# Patient Record
Sex: MALE | Race: WHITE | NOT HISPANIC OR LATINO | Employment: UNEMPLOYED | URBAN - METROPOLITAN AREA
[De-identification: names, ages, dates, MRNs, and addresses within clinical notes are randomized per-mention and may not be internally consistent; named-entity substitution may affect disease eponyms.]

---

## 2021-12-17 ENCOUNTER — HISTORICAL (OUTPATIENT)
Dept: LAB | Facility: HOSPITAL | Age: 31
End: 2021-12-17

## 2021-12-17 LAB
ABS NEUT (OLG): 1.84 X10(3)/MCL (ref 2.1–9.2)
ALBUMIN SERPL-MCNC: 4.1 GM/DL (ref 3.5–5)
ALBUMIN/GLOB SERPL: 1.4 RATIO (ref 1.1–2)
ALP SERPL-CCNC: 61 UNIT/L (ref 40–150)
ALT SERPL-CCNC: 90 UNIT/L (ref 0–55)
AST SERPL-CCNC: 48 UNIT/L (ref 5–34)
BASOPHILS # BLD AUTO: 0 X10(3)/MCL (ref 0–0.2)
BASOPHILS NFR BLD AUTO: 0 %
BILIRUB SERPL-MCNC: 0.5 MG/DL
BILIRUBIN DIRECT+TOT PNL SERPL-MCNC: 0.2 MG/DL (ref 0–0.5)
BILIRUBIN DIRECT+TOT PNL SERPL-MCNC: 0.3 MG/DL (ref 0–0.8)
BUN SERPL-MCNC: 12.4 MG/DL (ref 8.9–20.6)
CALCIUM SERPL-MCNC: 9.5 MG/DL (ref 8.7–10.5)
CHLORIDE SERPL-SCNC: 105 MMOL/L (ref 98–107)
CO2 SERPL-SCNC: 28 MMOL/L (ref 22–29)
CREAT SERPL-MCNC: 0.88 MG/DL (ref 0.73–1.18)
EOSINOPHIL # BLD AUTO: 0.1 X10(3)/MCL (ref 0–0.9)
EOSINOPHIL NFR BLD AUTO: 2 %
ERYTHROCYTE [DISTWIDTH] IN BLOOD BY AUTOMATED COUNT: 11.7 % (ref 11.5–14.5)
FERRITIN SERPL-MCNC: 73.74 NG/ML (ref 21.81–274.66)
GLOBULIN SER-MCNC: 2.9 GM/DL (ref 2.4–3.5)
GLUCOSE SERPL-MCNC: 104 MG/DL (ref 74–100)
HAV AB SER QL IA: REACTIVE
HBV CORE AB SERPL QL IA: NONREACTIVE
HBV SURFACE AB SER-ACNC: 13.91 MIU/ML
HBV SURFACE AB SERPL IA-ACNC: REACTIVE M[IU]/ML
HBV SURFACE AG SERPL QL IA: NONREACTIVE
HCT VFR BLD AUTO: 43.3 % (ref 40–51)
HGB BLD-MCNC: 14.8 GM/DL (ref 13.5–17.5)
HIV 1+2 AB+HIV1 P24 AG SERPL QL IA: NONREACTIVE
IMM GRANULOCYTES # BLD AUTO: 0.01 10*3/UL
IMM GRANULOCYTES NFR BLD AUTO: 0 %
INR PPP: 0.98 (ref 0.9–1.2)
LYMPHOCYTES # BLD AUTO: 2.9 X10(3)/MCL (ref 0.6–4.6)
LYMPHOCYTES NFR BLD AUTO: 54 %
MCH RBC QN AUTO: 28.9 PG (ref 26–34)
MCHC RBC AUTO-ENTMCNC: 34.2 GM/DL (ref 31–37)
MCV RBC AUTO: 84.6 FL (ref 80–100)
MONOCYTES # BLD AUTO: 0.5 X10(3)/MCL (ref 0.1–1.3)
MONOCYTES NFR BLD AUTO: 9 %
NEUTROPHILS # BLD AUTO: 1.84 X10(3)/MCL (ref 2.1–9.2)
NEUTROPHILS NFR BLD AUTO: 35 %
NRBC BLD AUTO-RTO: 0 % (ref 0–0.2)
PLATELET # BLD AUTO: 202 X10(3)/MCL (ref 130–400)
PMV BLD AUTO: 10.1 FL (ref 7.4–10.4)
POTASSIUM SERPL-SCNC: 3.8 MMOL/L (ref 3.5–5.1)
PROT SERPL-MCNC: 7 GM/DL (ref 6.4–8.3)
PROTHROMBIN TIME: 12.8 SECOND(S) (ref 11.9–14.4)
RBC # BLD AUTO: 5.12 X10(6)/MCL (ref 4.5–5.9)
SODIUM SERPL-SCNC: 140 MMOL/L (ref 136–145)
T PALLIDUM AB SER QL: NONREACTIVE
WBC # SPEC AUTO: 5.3 X10(3)/MCL (ref 4.5–11)

## 2022-06-16 ENCOUNTER — TELEPHONE (OUTPATIENT)
Dept: INFECTIOUS DISEASES | Facility: CLINIC | Age: 32
End: 2022-06-16

## 2022-06-16 DIAGNOSIS — B19.20 HEPATITIS C VIRUS INFECTION WITHOUT HEPATIC COMA, UNSPECIFIED CHRONICITY: Primary | ICD-10-CM

## 2022-06-16 NOTE — TELEPHONE ENCOUNTER
----- Message from Katy Merchant sent at 6/16/2022 11:07 AM CDT -----  Regarding: FIBROSCAN ORDER  #LEONARD#    Need order for fibroscan please

## 2022-08-05 DIAGNOSIS — B19.20 HEPATITIS C VIRUS INFECTION WITHOUT HEPATIC COMA, UNSPECIFIED CHRONICITY: Primary | ICD-10-CM

## 2022-08-30 ENCOUNTER — TELEPHONE (OUTPATIENT)
Dept: INFECTIOUS DISEASES | Facility: CLINIC | Age: 32
End: 2022-08-30

## 2022-08-30 NOTE — TELEPHONE ENCOUNTER
Called Ashtyn,  here at [facility], to let her know that this patient needs u/s and labs prior to appt.  Voiced understanding.

## 2022-09-09 ENCOUNTER — LAB VISIT (OUTPATIENT)
Dept: LAB | Facility: HOSPITAL | Age: 32
End: 2022-09-09
Attending: NURSE PRACTITIONER
Payer: COMMERCIAL

## 2022-09-09 DIAGNOSIS — B19.20 HEPATITIS C VIRUS INFECTION WITHOUT HEPATIC COMA, UNSPECIFIED CHRONICITY: ICD-10-CM

## 2022-09-09 LAB
ALBUMIN SERPL-MCNC: 4 GM/DL (ref 3.5–5)
ALBUMIN/GLOB SERPL: 1.3 RATIO (ref 1.1–2)
ALP SERPL-CCNC: 57 UNIT/L (ref 40–150)
ALT SERPL-CCNC: 164 UNIT/L (ref 0–55)
AST SERPL-CCNC: 67 UNIT/L (ref 5–34)
BASOPHILS # BLD AUTO: 0.02 X10(3)/MCL (ref 0–0.2)
BASOPHILS NFR BLD AUTO: 0.4 %
BILIRUBIN DIRECT+TOT PNL SERPL-MCNC: 0.4 MG/DL
BUN SERPL-MCNC: 13.9 MG/DL (ref 8.9–20.6)
CALCIUM SERPL-MCNC: 9.3 MG/DL (ref 8.4–10.2)
CHLORIDE SERPL-SCNC: 107 MMOL/L (ref 98–107)
CO2 SERPL-SCNC: 26 MMOL/L (ref 22–29)
CREAT SERPL-MCNC: 0.81 MG/DL (ref 0.73–1.18)
EOSINOPHIL # BLD AUTO: 0.11 X10(3)/MCL (ref 0–0.9)
EOSINOPHIL NFR BLD AUTO: 2.3 %
ERYTHROCYTE [DISTWIDTH] IN BLOOD BY AUTOMATED COUNT: 11.9 % (ref 11.5–17)
FERRITIN SERPL-MCNC: 84 NG/ML (ref 21.81–274.66)
GFR SERPLBLD CREATININE-BSD FMLA CKD-EPI: >60 MLS/MIN/1.73/M2
GLOBULIN SER-MCNC: 3.1 GM/DL (ref 2.4–3.5)
GLUCOSE SERPL-MCNC: 84 MG/DL (ref 74–100)
HCT VFR BLD AUTO: 44.3 % (ref 42–52)
HGB BLD-MCNC: 14.7 GM/DL (ref 14–18)
HIV 1+2 AB+HIV1 P24 AG SERPL QL IA: NONREACTIVE
IMM GRANULOCYTES # BLD AUTO: 0.01 X10(3)/MCL (ref 0–0.04)
IMM GRANULOCYTES NFR BLD AUTO: 0.2 %
LYMPHOCYTES # BLD AUTO: 2.52 X10(3)/MCL (ref 0.6–4.6)
LYMPHOCYTES NFR BLD AUTO: 52.3 %
MCH RBC QN AUTO: 28 PG (ref 27–31)
MCHC RBC AUTO-ENTMCNC: 33.2 MG/DL (ref 33–36)
MCV RBC AUTO: 84.4 FL (ref 80–94)
MONOCYTES # BLD AUTO: 0.4 X10(3)/MCL (ref 0.1–1.3)
MONOCYTES NFR BLD AUTO: 8.3 %
NEUTROPHILS # BLD AUTO: 1.8 X10(3)/MCL (ref 2.1–9.2)
NEUTROPHILS NFR BLD AUTO: 36.5 %
NRBC BLD AUTO-RTO: 0 %
PLATELET # BLD AUTO: 207 X10(3)/MCL (ref 130–400)
PMV BLD AUTO: 9.6 FL (ref 7.4–10.4)
POTASSIUM SERPL-SCNC: 4.1 MMOL/L (ref 3.5–5.1)
PROT SERPL-MCNC: 7.1 GM/DL (ref 6.4–8.3)
RBC # BLD AUTO: 5.25 X10(6)/MCL (ref 4.7–6.1)
SODIUM SERPL-SCNC: 142 MMOL/L (ref 136–145)
T PALLIDUM AB SER QL: NONREACTIVE
WBC # SPEC AUTO: 4.8 X10(3)/MCL (ref 4.5–11.5)

## 2022-09-09 PROCEDURE — 81596 NFCT DS CHRNC HCV 6 ASSAYS: CPT

## 2022-09-09 PROCEDURE — 85610 PROTHROMBIN TIME: CPT

## 2022-09-09 PROCEDURE — 80053 COMPREHEN METABOLIC PANEL: CPT

## 2022-09-09 PROCEDURE — 82728 ASSAY OF FERRITIN: CPT

## 2022-09-09 PROCEDURE — 36415 COLL VENOUS BLD VENIPUNCTURE: CPT

## 2022-09-09 PROCEDURE — 86780 TREPONEMA PALLIDUM: CPT

## 2022-09-09 PROCEDURE — 87522 HEPATITIS C REVRS TRNSCRPJ: CPT

## 2022-09-09 PROCEDURE — 85025 COMPLETE CBC W/AUTO DIFF WBC: CPT

## 2022-09-09 PROCEDURE — 87389 HIV-1 AG W/HIV-1&-2 AB AG IA: CPT

## 2022-09-09 PROCEDURE — 86039 ANTINUCLEAR ANTIBODIES (ANA): CPT

## 2022-09-10 LAB — HCV RNA SERPL NAA+PROBE-ACNC: ABNORMAL IU/ML

## 2022-09-12 LAB
A2 MACROGLOB SERPL-MCNC: 176 MG/DL (ref 100–280)
ALT SERPL W P-5'-P-CCNC: 181 U/L (ref 7–55)
ANNOTATION COMMENT IMP: ABNORMAL
APO A-I SERPL-MCNC: 122 MG/DL
BILIRUB SERPL-MCNC: 0.3 MG/DL
FIBROSIS STAGE SERPL QL: ABNORMAL
GGT SERPL-CCNC: 26 U/L (ref 8–61)
HAPTOGLOB SERPL NEPH-MCNC: 94 MG/DL (ref 30–200)
LIVER FIBR SCORE SERPL CALC.FIBROSURE: 0.12
LIVER FIBROSIS INTERPRETATION SER-IMP: ABNORMAL
NECROINFLAMMATORY ACT GRADE SERPL QL: ABNORMAL
NECROINFLAMMATORY ACT SCORE SERPL: 0.73
NECROINFLAMMATORY ACTIV INTERP SER-IMP: ABNORMAL
SERIAL #: ABNORMAL

## 2022-09-14 LAB
AR ANA INTERPRETIVE COMMENT: NORMAL
AR ANTINUCLEAR ANTIBODY (ANA), HEP-2, IGG: NORMAL

## 2022-10-07 ENCOUNTER — HOSPITAL ENCOUNTER (OUTPATIENT)
Dept: RADIOLOGY | Facility: HOSPITAL | Age: 32
Discharge: HOME | End: 2022-10-07
Attending: NURSE PRACTITIONER
Payer: COMMERCIAL

## 2022-10-07 ENCOUNTER — TELEPHONE (OUTPATIENT)
Dept: INFECTIOUS DISEASES | Facility: CLINIC | Age: 32
End: 2022-10-07
Payer: COMMERCIAL

## 2022-10-07 DIAGNOSIS — B19.20 HEPATITIS C VIRUS INFECTION WITHOUT HEPATIC COMA, UNSPECIFIED CHRONICITY: ICD-10-CM

## 2022-10-07 PROCEDURE — 76705 ECHO EXAM OF ABDOMEN: CPT | Mod: TC

## 2022-10-07 NOTE — TELEPHONE ENCOUNTER
----- Message from MAHESH Lam sent at 10/7/2022  2:55 PM CDT -----  Liver US shows hepatic steatosis. Please contact patient with results and the following recommendations:  Goal BMI < 25.   Limit consumption of high fat foods, high sugar foods, and salt.   Avoid alcohol and illicit drug use.  Increase exercise activity; 30 minutes of strenuous activity 3-5 x week.   Also, I do not see where patient has an appt scheduled.

## 2022-10-07 NOTE — PROGRESS NOTES
Liver US shows hepatic steatosis. Please contact patient with results and the following recommendations:  Goal BMI < 25.   Limit consumption of high fat foods, high sugar foods, and salt.   Avoid alcohol and illicit drug use.  Increase exercise activity; 30 minutes of strenuous activity 3-5 x week.   Also, I do not see where patient has an appt scheduled.

## 2022-10-10 ENCOUNTER — TELEPHONE (OUTPATIENT)
Dept: INFECTIOUS DISEASES | Facility: CLINIC | Age: 32
End: 2022-10-10
Payer: COMMERCIAL

## 2022-10-10 NOTE — TELEPHONE ENCOUNTER
Called [phone] Spoke with  -Angie she asked that I fax results to [phone]. Will fax this note and US result

## 2022-11-23 DIAGNOSIS — R10.32 LEFT LOWER QUADRANT PAIN: Primary | ICD-10-CM

## 2022-12-02 ENCOUNTER — OFFICE VISIT (OUTPATIENT)
Dept: INFECTIOUS DISEASES | Facility: CLINIC | Age: 32
End: 2022-12-02
Payer: COMMERCIAL

## 2022-12-02 ENCOUNTER — TELEPHONE (OUTPATIENT)
Dept: INFECTIOUS DISEASES | Facility: CLINIC | Age: 32
End: 2022-12-02

## 2022-12-02 VITALS
HEART RATE: 63 BPM | WEIGHT: 245.13 LBS | RESPIRATION RATE: 16 BRPM | TEMPERATURE: 98 F | SYSTOLIC BLOOD PRESSURE: 134 MMHG | DIASTOLIC BLOOD PRESSURE: 87 MMHG | BODY MASS INDEX: 33.2 KG/M2 | HEIGHT: 72 IN

## 2022-12-02 DIAGNOSIS — Z23 IMMUNIZATION DUE: ICD-10-CM

## 2022-12-02 DIAGNOSIS — K76.0 HEPATIC STEATOSIS: ICD-10-CM

## 2022-12-02 DIAGNOSIS — B18.2 CHRONIC HEPATITIS C WITHOUT HEPATIC COMA: Primary | ICD-10-CM

## 2022-12-02 LAB
ALBUMIN SERPL-MCNC: 4.2 GM/DL (ref 3.5–5)
ALBUMIN/GLOB SERPL: 1.4 RATIO (ref 1.1–2)
ALP SERPL-CCNC: 59 UNIT/L (ref 40–150)
ALT SERPL-CCNC: 163 UNIT/L (ref 0–55)
AST SERPL-CCNC: 74 UNIT/L (ref 5–34)
BASOPHILS # BLD AUTO: 0.02 X10(3)/MCL (ref 0–0.2)
BASOPHILS NFR BLD AUTO: 0.4 %
BILIRUBIN DIRECT+TOT PNL SERPL-MCNC: 0.6 MG/DL
BUN SERPL-MCNC: 10.7 MG/DL (ref 8.9–20.6)
CALCIUM SERPL-MCNC: 9.9 MG/DL (ref 8.4–10.2)
CHLORIDE SERPL-SCNC: 103 MMOL/L (ref 98–107)
CO2 SERPL-SCNC: 26 MMOL/L (ref 22–29)
CREAT SERPL-MCNC: 0.81 MG/DL (ref 0.73–1.18)
EOSINOPHIL # BLD AUTO: 0.1 X10(3)/MCL (ref 0–0.9)
EOSINOPHIL NFR BLD AUTO: 2.1 %
ERYTHROCYTE [DISTWIDTH] IN BLOOD BY AUTOMATED COUNT: 11.9 % (ref 11.5–17)
ERYTHROCYTE [SEDIMENTATION RATE] IN BLOOD: 1 MM/HR (ref 0–15)
GFR SERPLBLD CREATININE-BSD FMLA CKD-EPI: >60 MLS/MIN/1.73/M2
GLOBULIN SER-MCNC: 3 GM/DL (ref 2.4–3.5)
GLUCOSE SERPL-MCNC: 71 MG/DL (ref 74–100)
HCT VFR BLD AUTO: 46.8 % (ref 42–52)
HGB BLD-MCNC: 15.5 GM/DL (ref 14–18)
HIV 1+2 AB+HIV1 P24 AG SERPL QL IA: NONREACTIVE
IMM GRANULOCYTES # BLD AUTO: 0.01 X10(3)/MCL (ref 0–0.04)
IMM GRANULOCYTES NFR BLD AUTO: 0.2 %
LYMPHOCYTES # BLD AUTO: 2.39 X10(3)/MCL (ref 0.6–4.6)
LYMPHOCYTES NFR BLD AUTO: 49.1 %
MCH RBC QN AUTO: 28.5 PG (ref 27–31)
MCHC RBC AUTO-ENTMCNC: 33.1 MG/DL (ref 33–36)
MCV RBC AUTO: 86 FL (ref 80–94)
MONOCYTES # BLD AUTO: 0.44 X10(3)/MCL (ref 0.1–1.3)
MONOCYTES NFR BLD AUTO: 9 %
NEUTROPHILS # BLD AUTO: 1.9 X10(3)/MCL (ref 2.1–9.2)
NEUTROPHILS NFR BLD AUTO: 39.2 %
NRBC BLD AUTO-RTO: 0 %
PLATELET # BLD AUTO: 215 X10(3)/MCL (ref 130–400)
PMV BLD AUTO: 10.8 FL (ref 7.4–10.4)
POTASSIUM SERPL-SCNC: 4.2 MMOL/L (ref 3.5–5.1)
PROT SERPL-MCNC: 7.2 GM/DL (ref 6.4–8.3)
RBC # BLD AUTO: 5.44 X10(6)/MCL (ref 4.7–6.1)
SODIUM SERPL-SCNC: 139 MMOL/L (ref 136–145)
WBC # SPEC AUTO: 4.9 X10(3)/MCL (ref 4.5–11.5)

## 2022-12-02 PROCEDURE — 36415 COLL VENOUS BLD VENIPUNCTURE: CPT | Performed by: NURSE PRACTITIONER

## 2022-12-02 PROCEDURE — 99204 PR OFFICE/OUTPT VISIT, NEW, LEVL IV, 45-59 MIN: ICD-10-PCS | Mod: S$PBB,,, | Performed by: NURSE PRACTITIONER

## 2022-12-02 PROCEDURE — 85610 PROTHROMBIN TIME: CPT | Performed by: NURSE PRACTITIONER

## 2022-12-02 PROCEDURE — 87389 HIV-1 AG W/HIV-1&-2 AB AG IA: CPT | Performed by: NURSE PRACTITIONER

## 2022-12-02 PROCEDURE — 85025 COMPLETE CBC W/AUTO DIFF WBC: CPT | Performed by: NURSE PRACTITIONER

## 2022-12-02 PROCEDURE — 80053 COMPREHEN METABOLIC PANEL: CPT | Performed by: NURSE PRACTITIONER

## 2022-12-02 PROCEDURE — 90472 IMMUNIZATION ADMIN EACH ADD: CPT | Mod: PBBFAC

## 2022-12-02 PROCEDURE — 99214 OFFICE O/P EST MOD 30 MIN: CPT | Mod: PBBFAC | Performed by: NURSE PRACTITIONER

## 2022-12-02 PROCEDURE — 99204 OFFICE O/P NEW MOD 45 MIN: CPT | Mod: S$PBB,,, | Performed by: NURSE PRACTITIONER

## 2022-12-02 PROCEDURE — 85651 RBC SED RATE NONAUTOMATED: CPT | Performed by: NURSE PRACTITIONER

## 2022-12-02 PROCEDURE — 90686 IIV4 VACC NO PRSV 0.5 ML IM: CPT | Mod: PBBFAC

## 2022-12-02 PROCEDURE — 87522 HEPATITIS C REVRS TRNSCRPJ: CPT | Performed by: NURSE PRACTITIONER

## 2022-12-02 RX ORDER — VELPATASVIR AND SOFOSBUVIR 100; 400 MG/1; MG/1
1 TABLET, FILM COATED ORAL DAILY
Qty: 28 TABLET | Refills: 2 | Status: SHIPPED | OUTPATIENT
Start: 2022-12-02

## 2022-12-02 RX ORDER — SERTRALINE HYDROCHLORIDE 100 MG/1
150 TABLET, FILM COATED ORAL DAILY
COMMUNITY

## 2022-12-02 NOTE — PROGRESS NOTES
Subjective:       Patient ID: Bob Hook is a 31 y.o. male.    Chief Complaint: New referral    Bob Hook is a 32 y/o [facility] inmate here for initial Hep C visit. He is accompanied by two guards. Pt tells me he was dx around [id number], but was never treated.  Risk factors include IVDU (heroine and opiates) and several professional tattoos. Pt denies blood transfusion. He has been incarcerated for the last 22 months.  Denies current alcohol or drug use.  Pretreatment labs done 9/9/22 Hep C VL 11.9 million, F0, Gt2b, APRI 0.594, treatment naive, Child Smith A, AST 67, , GOVIND neg, RPR NR, HIV NR, Hep B surface ab neg, Hep B core ab NR, Hep B surface ab reactive, Hep A IGG reactive.  Pt will need a few updated baseline labs today.  Liver US done 10/7/22 shows hepatic steatosis. Pt denies fever, headache, chills, visual problems, icterus, jaundice, N/V/D, esophageal varices, SOB,cough, abd pain, ascites or hunter colored stools.   BMI 33. He is due for a COVID, flu, and Tdap vaccine today. Ptis amenable. We do not have COVID vaccine here so he will discuss further with [facility].     I spent a total of 45 minutes on the day of the visit.This includes face to face time and non-face to face time preparing to see the patient (eg, review of tests), obtaining and/or reviewing separately obtained history, documenting clinical information in the electronic or other health record, independently interpreting results and communicating results to the patient/family/caregiver, or care coordinator.       Review of Systems   Constitutional: Negative.    HENT: Negative.     Eyes: Negative.    Respiratory: Negative.     Cardiovascular: Negative.    Gastrointestinal: Negative.    Genitourinary: Negative.    Musculoskeletal: Negative.    Integumentary:  Negative.   Neurological: Negative.    Psychiatric/Behavioral: Negative.         Objective:      Physical Exam  Vitals reviewed.   Constitutional:       General: He is not in acute  distress.     Appearance: Normal appearance.   HENT:      Head: Normocephalic.      Right Ear: External ear normal.      Left Ear: External ear normal.      Nose: Nose normal.      Mouth/Throat:      Mouth: Mucous membranes are moist.      Pharynx: Oropharynx is clear.      Comments: No uvula present, surgically removed  Eyes:      General: No scleral icterus.     Extraocular Movements: Extraocular movements intact.      Conjunctiva/sclera: Conjunctivae normal.      Pupils: Pupils are equal, round, and reactive to light.   Cardiovascular:      Rate and Rhythm: Normal rate and regular rhythm.      Pulses: Normal pulses.      Heart sounds: Normal heart sounds.   Pulmonary:      Effort: Pulmonary effort is normal. No respiratory distress.      Breath sounds: Normal breath sounds.   Abdominal:      General: Bowel sounds are normal. There is no distension.      Palpations: Abdomen is soft. There is no mass.      Tenderness: There is no abdominal tenderness. There is no right CVA tenderness or left CVA tenderness.      Hernia: No hernia is present.   Musculoskeletal:         General: No tenderness or signs of injury. Normal range of motion.      Cervical back: Normal range of motion and neck supple.      Right lower leg: No edema.      Left lower leg: No edema.   Lymphadenopathy:      Cervical: No cervical adenopathy.   Skin:     General: Skin is warm and dry.      Capillary Refill: Capillary refill takes less than 2 seconds.      Findings: No erythema or lesion.   Neurological:      General: No focal deficit present.      Mental Status: He is alert and oriented to person, place, and time. Mental status is at baseline.   Psychiatric:         Mood and Affect: Mood normal.         Behavior: Behavior normal.         Thought Content: Thought content normal.         Judgment: Judgment normal.       Assessment:       Problem List Items Addressed This Visit    None  Visit Diagnoses       Chronic hepatitis C without hepatic coma     -  Primary    Relevant Medications    sofosbuvir-velpatasvir 400-100 mg Tab    Other Relevant Orders    CBC Auto Differential    Comprehensive Metabolic Panel    Protime-INR    HIV 1/2 Ag/Ab (4th Gen)    Sedimentation rate    Hepatitis C Virus Quantitative    Hepatic steatosis        Immunization due        Relevant Orders    Influenza - Quadrivalent (PF) (Completed)    (In Office Administered) Tdap Vaccine (Completed)              Plan:       Chronic hepatitis C without hepatic coma  -     CBC Auto Differential; Future; Expected date: 12/02/2022  -     Comprehensive Metabolic Panel; Future; Expected date: 12/02/2022  -     Protime-INR; Future; Expected date: 12/02/2022  -     HIV 1/2 Ag/Ab (4th Gen); Future; Expected date: 12/02/2022  -     Sedimentation rate; Future; Expected date: 12/02/2022  -     Hepatitis C Virus Quantitative; Future; Expected date: 12/02/2022  -     sofosbuvir-velpatasvir 400-100 mg Tab; Take 1 tablet by mouth once daily.  Dispense: 28 tablet; Refill: 2  Pretreatment labs done 9/9/22 Hep C VL 11.9 million, F0, Gt2b, APRI 0.594, treatment naive, Child Smith A, AST 67, , GOVIND neg, RPR NR, HIV NR, Hep B surface ab neg, Hep B core ab NR, Hep B surface ab reactive, Hep A IGG reactive.    Pt will need a few updated baseline labs today.    Liver US done 10/7/22 shows hepatic steatosis.  Long discussion regarding Hep C disease process and available treatment options to include potential risk versus benefits as well as potential adverse effects. Reviewed trial data to include AASLD/IDSA guidelines and available studies - excellent cure rates for pangenotypic disease with Epclusa. All questions addressed at length. Patient voices understanding and is in agreement to proceed. Will plan to start Epclusa today. Refer to PAP for medication assistance if needed. Formal HCV  RN education visit today. Return to clinic 4 weeks after starting meds. HCV  to order all labs and  follow-up per HCV protocol.   Pt encouraged to refrain from alcohol and illicit drug use.  Blood and sex precautions discussed. Do not share a needle, razor, nail clippers, toothbrush, or drug paraphernalia with anyone.  No Tylenol at doses greater than 2000 mg per day.    COVID and flu precautions discussed   Vaccines recommended    Hepatic steatosis  Goal BMI < 25.   Limit consumption of high fat foods, high sugar foods, and salt.   Avoid alcohol and illicit drug use.  Increase exercise activity; 30 minutes of strenuous activity 3-5 x week.     Immunization due  -     Influenza - Quadrivalent (PF); Future; Expected date: 12/02/2022  -     (In Office Administered) Tdap Vaccine; Future; Expected date: 12/02/2022

## 2022-12-02 NOTE — TELEPHONE ENCOUNTER
Spoke with Kevin @ [facility] to inform him that IRVING Jhaveri had escribed a Rx for Epclusa to [facility].  Kevin requested office notes and labs to place in pt's chart at [facility].  Records faxed to [facility].  Kevin will call hcv  back with start date on Epclusa so we can send lab dates and appt dates.

## 2022-12-03 LAB — HCV RNA SERPL NAA+PROBE-ACNC: ABNORMAL IU/ML

## 2022-12-06 ENCOUNTER — OFFICE VISIT (OUTPATIENT)
Dept: SURGERY | Facility: CLINIC | Age: 32
End: 2022-12-06
Payer: COMMERCIAL

## 2022-12-06 VITALS
RESPIRATION RATE: 18 BRPM | DIASTOLIC BLOOD PRESSURE: 94 MMHG | BODY MASS INDEX: 33.24 KG/M2 | HEIGHT: 72 IN | HEART RATE: 63 BPM | OXYGEN SATURATION: 98 % | SYSTOLIC BLOOD PRESSURE: 136 MMHG | TEMPERATURE: 98 F

## 2022-12-06 DIAGNOSIS — R10.32 LEFT LOWER QUADRANT PAIN: ICD-10-CM

## 2022-12-06 PROCEDURE — 99213 OFFICE O/P EST LOW 20 MIN: CPT | Mod: PBBFAC

## 2022-12-06 NOTE — PROGRESS NOTES
Patient seen by Dr. Pemberton. Dr. Pemberton wants to see patient back in 1 month. Dr. Pemberton wants the patient to do a CT scan as well.     Fall Risk

## 2022-12-06 NOTE — PROGRESS NOTES
[facility] General Surgery Clinic Note    HPI: Bob Hook is a 31 y.o. with PMH of Hep C (currently being treated). He has a surgical history of left inguinal hernia repair when he was about 13 years old. Patient reports new-onset hernia about 6 months ago in the region superior to the region of the prior repair. He has intermittent pain, worse with laughing. He cannot see the hernia, but feels pain and weakness in the area. As an inmate at the facility, he was seen by a physician who did an ultrasound and noted the hernia, but they could not feel the hernia at the facility. Patient denies nausea, vomiting, fever, chills. He reports intermittent diarrhea and constipation.    PMH:   Past Medical History:   Diagnosis Date    Depression       Meds:   Current Outpatient Medications:     sertraline (ZOLOFT) 100 MG tablet, Take 150 mg by mouth once daily., Disp: , Rfl:     sofosbuvir-velpatasvir 400-100 mg Tab, Take 1 tablet by mouth once daily. (Patient not taking: Reported on 12/6/2022), Disp: 28 tablet, Rfl: 2  Allergies: Review of patient's allergies indicates:  No Known Allergies  Social History:   Social History     Tobacco Use    Smoking status: Former     Types: Cigarettes, Vaping with nicotine    Smokeless tobacco: Former   Substance Use Topics    Alcohol use: Not Currently    Drug use: Yes     Types: Marijuana     Comment: opiates     Family History:   Family History   Problem Relation Age of Onset    Thyroid disease Mother     Hypertension Father     Kidney disease Father     Diabetes Father     Heart disease Father     No Known Problems Sister      Surgical History:   Past Surgical History:   Procedure Laterality Date    ADENOIDECTOMY      HERNIA REPAIR      TONSILLECTOMY      UVULECTOMY       Review of Systems:  Skin: No rashes or itching.  Head: Denies headache or recent trauma.  Eyes: Denies eye pain or double vision.  Neck: Denies swelling or hoarseness of voice.  Respiratory: Denies shortness of breath or  chest pain  Cardiac: Denies palpitations or swelling in hands/feet.  Gastrointestinal: Denies nausea, denies vomiting.  Having intermittent constipation, diarrhea my resident  Urinary: Denies dysuria or hematuria.  Vascular: Denies claudication or leg swelling.  Neuro: Denies motor deficits. Denies weakness.  Endocrine: Denies excessive sweating or cold intolerance.  Psych: Denies memory problems. Denies anxiety.    Objective:    Vitals:  Vitals:    12/06/22 0903   BP: (!) 136/94   Pulse: 63   Resp: 18   Temp: 97.9 °F (36.6 °C)        Intake/Output:  [unfilled]     Physical Exam:  Gen: NAD  Neuro: awake, alert, answering questions appropriately  CV: RRR  Resp: non-labored breathing, DONNA  Abd: soft, ND, NT  :  No palpable left inguinal hernia, if there was a prior repair, the repair is intact.  Ext: moves all 4 spontaneously and purposefully  Skin: warm, well perfused    Pertinent Labs:  none    Imaging:  none    Micro/Path/Other:  none    Assessment/Plan:   31-year-old male with past medical history notable for HCV, prior left inguinal hernia repair 13 years old.  Patient presents with left lower quadrant pain intermittently, concern for recurrent hernia.  On exam, no palpable hernia.  We will obtain a CT abdomen and pelvis prior to bringing the patient back to clinic in 1 month.      Cuba Pemberton MD  [employer] General Surgery PGY3

## 2022-12-22 DIAGNOSIS — B18.2 CHRONIC HEPATITIS C WITHOUT HEPATIC COMA: Primary | ICD-10-CM

## 2023-01-13 ENCOUNTER — HOSPITAL ENCOUNTER (OUTPATIENT)
Dept: RADIOLOGY | Facility: HOSPITAL | Age: 33
Discharge: HOME | End: 2023-01-13
Attending: STUDENT IN AN ORGANIZED HEALTH CARE EDUCATION/TRAINING PROGRAM
Payer: COMMERCIAL

## 2023-01-13 DIAGNOSIS — R10.32 LEFT LOWER QUADRANT PAIN: ICD-10-CM

## 2023-01-13 PROCEDURE — 74176 CT ABD & PELVIS W/O CONTRAST: CPT | Mod: TC

## 2023-01-26 ENCOUNTER — OFFICE VISIT (OUTPATIENT)
Dept: SURGERY | Facility: CLINIC | Age: 33
End: 2023-01-26
Payer: COMMERCIAL

## 2023-01-26 VITALS
TEMPERATURE: 98 F | SYSTOLIC BLOOD PRESSURE: 152 MMHG | WEIGHT: 235 LBS | OXYGEN SATURATION: 97 % | HEART RATE: 73 BPM | BODY MASS INDEX: 31.83 KG/M2 | DIASTOLIC BLOOD PRESSURE: 99 MMHG | RESPIRATION RATE: 18 BRPM | HEIGHT: 72 IN

## 2023-01-26 DIAGNOSIS — N50.82 SCROTAL PAIN: Primary | ICD-10-CM

## 2023-01-26 PROCEDURE — 99214 OFFICE O/P EST MOD 30 MIN: CPT | Mod: PBBFAC

## 2023-01-26 NOTE — PROGRESS NOTES
[facility] General Surgery Clinic Note    HPI: Bob Hook is a 32 y.o. with hx of HepC and left inguinal hernia repair at the age of 13 who reports 6 months of left testicular pain. He is an inmate and reports that a hernia was seen on ultrasound at the retirement. He was seen in our clinic a month ago. No hernia was palpated at the time, so a CT was obtain. No hernia present on CT.    PMH:   Past Medical History:   Diagnosis Date    Depression       Meds:   Current Outpatient Medications:     sertraline (ZOLOFT) 100 MG tablet, Take 150 mg by mouth once daily., Disp: , Rfl:     sofosbuvir-velpatasvir 400-100 mg Tab, Take 1 tablet by mouth once daily., Disp: 28 tablet, Rfl: 2    Allergies: Review of patient's allergies indicates:  No Known Allergies    Social History:   Social History     Tobacco Use    Smoking status: Former     Types: Cigarettes, Vaping with nicotine     Passive exposure: Past    Smokeless tobacco: Former   Substance Use Topics    Alcohol use: Not Currently    Drug use: Yes     Types: Marijuana     Comment: opiates     Family History:   Family History   Problem Relation Age of Onset    Thyroid disease Mother     Hypertension Father     Kidney disease Father     Diabetes Father     Heart disease Father     No Known Problems Sister      Surgical History:   Past Surgical History:   Procedure Laterality Date    ADENOIDECTOMY      HERNIA REPAIR      TONSILLECTOMY      UVULECTOMY       Review of Systems:  10 point review of systems denied unless otherwise indicated above HPI    Objective:    Vitals:  Vitals:    01/26/23 1249   BP: (!) 152/99   Pulse: 73   Resp: 18   Temp: 97.9 °F (36.6 °C)        Physical Exam:  Gen: NAD  Neuro: awake, alert, answering questions appropriately  CV: RRR  Resp: non-labored breathing  Abd: soft, ND, mildly tender to palpation in the LLQ  : nontender to palpation of scrotum, no hernia felt in scrotum or inguinal canal  Ext: moves all 4 spontaneously and purposefully  Skin: warm,  well perfused    Imagin. No acute abdominopelvic findings on this noncontrast scan.  2. No significant inguinal hernia is seen.    Assessment/Plan:  Patient is a 32 year old with hx of LIHR at age 13 with 6 month history of left testicular pain. CT negative for hernia.    - Testicular US  - Urology referral  - f/u randan    Cuba Pemberton MD  [employer] General Surgery PGY3

## 2023-01-26 NOTE — PROGRESS NOTES
Pt seen by Dr. Pemberton; US Scrotum ordered; Referral to urology placed; Inmate paperwork completed by doctor to be given to ; Pt instructed to return to clinic as needed;  escorted pt out of clinic

## 2023-02-07 ENCOUNTER — HOSPITAL ENCOUNTER (OUTPATIENT)
Dept: RADIOLOGY | Facility: HOSPITAL | Age: 33
Discharge: HOME | End: 2023-02-07
Attending: STUDENT IN AN ORGANIZED HEALTH CARE EDUCATION/TRAINING PROGRAM
Payer: COMMERCIAL

## 2023-02-07 DIAGNOSIS — N50.82 SCROTAL PAIN: ICD-10-CM

## 2023-02-07 PROCEDURE — 76870 US EXAM SCROTUM: CPT | Mod: TC

## 2023-02-27 ENCOUNTER — OFFICE VISIT (OUTPATIENT)
Dept: UROLOGY | Facility: CLINIC | Age: 33
End: 2023-02-27
Payer: COMMERCIAL

## 2023-02-27 VITALS
HEART RATE: 57 BPM | SYSTOLIC BLOOD PRESSURE: 152 MMHG | OXYGEN SATURATION: 98 % | DIASTOLIC BLOOD PRESSURE: 98 MMHG | BODY MASS INDEX: 31.97 KG/M2 | HEIGHT: 72 IN | WEIGHT: 236 LBS | RESPIRATION RATE: 18 BRPM

## 2023-02-27 DIAGNOSIS — N50.82 SCROTAL PAIN: ICD-10-CM

## 2023-02-27 PROCEDURE — 99203 OFFICE O/P NEW LOW 30 MIN: CPT | Mod: S$PBB,,, | Performed by: NURSE PRACTITIONER

## 2023-02-27 PROCEDURE — 99203 PR OFFICE/OUTPT VISIT, NEW, LEVL III, 30-44 MIN: ICD-10-PCS | Mod: S$PBB,,, | Performed by: NURSE PRACTITIONER

## 2023-02-27 PROCEDURE — 99214 OFFICE O/P EST MOD 30 MIN: CPT | Mod: PBBFAC | Performed by: NURSE PRACTITIONER

## 2023-02-27 NOTE — PROGRESS NOTES
Chief Complaint:   Chief Complaint   Patient presents with    Evaluation for Scrotal pain; Referral 01/26/2023 (US done 0       HPI:  Patient is a 32-year-old male referred to Urology for scrotal pain.  Patient has a past history of hernia, a testicular ultrasound performed showed no torsion or mass, a CT of the abdomen pelvis without contrast revealed no inguinal hernia, No acute abdominopelvic findings on this noncontrast scan.on 01/13/2023.  The patient had no complaint scrotal pain or lower abdominal pain with palpation.    Allergies:  Review of patient's allergies indicates:  No Known Allergies    Medications:  Current Outpatient Medications   Medication Sig Dispense Refill    sertraline (ZOLOFT) 100 MG tablet Take 150 mg by mouth once daily.      sofosbuvir-velpatasvir 400-100 mg Tab Take 1 tablet by mouth once daily. 28 tablet 2     No current facility-administered medications for this visit.       Review of Systems:  General: No fever, chills, fatigability, or weight loss.  Skin: No rashes, itching, or changes in color or texture of skin.  Chest: Denies SINGLETON, cyanosis, wheezing, cough, and sputum production.  Abdomen: Appetite fine. No weight loss. Denies diarrhea, abdominal pain, hematemesis, or blood in stool.  Musculoskeletal: No joint stiffness or swelling. Denies back pain.  : As above.  All other review of systems negative.    PMH:  Past Medical History:   Diagnosis Date    Depression        PSH:  Past Surgical History:   Procedure Laterality Date    ADENOIDECTOMY      HERNIA REPAIR      TONSILLECTOMY      UVULECTOMY         FamHx:  Family History   Problem Relation Age of Onset    Thyroid disease Mother     Hypertension Father     Kidney disease Father     Diabetes Father     Heart disease Father     No Known Problems Sister        SocHx:  Social History     Socioeconomic History    Marital status: Single   Tobacco Use    Smoking status: Former     Types: Cigarettes, Vaping with nicotine     Passive  exposure: Past    Smokeless tobacco: Former   Substance and Sexual Activity    Alcohol use: Not Currently    Drug use: Yes     Types: Marijuana     Comment: opiates    Sexual activity: Not Currently     Partners: Female       Physical Exam:  Vitals:    02/27/23 1020   BP: (!) 152/98   Pulse: (!) 57   Resp: 18     General: A&Ox3, no apparent distress, no deformities  Neck: No masses, normal thyroid  Lungs: CTA mikey, no use of accessory muscles  Heart: RRR, no arrhythmias  Abdomen: Soft, NT, ND, no masses, no hernias, no hepatosplenomegaly  Lymphatic: Neck and groin nodes negative  Skin: The skin is warm and dry. No jaundice.  Ext: No c/c/e.    GUMale: Test desc mikey, no abnormalities of epididymus. Penis, with normal penile and scrotal skin. Meatus normal.         Imaging:  As noted above mentioned      Impression:  Left scrotal pain      Plan: F/U PRN

## 2023-02-28 ENCOUNTER — TELEPHONE (OUTPATIENT)
Dept: INFECTIOUS DISEASES | Facility: CLINIC | Age: 33
End: 2023-02-28
Payer: COMMERCIAL

## 2023-02-28 NOTE — TELEPHONE ENCOUNTER
Spoke with Ashtyn and informed pt has missed several appt, if she can assure pt will come to appt pt can schedule, as per Shakila.

## 2023-02-28 NOTE — TELEPHONE ENCOUNTER
----- Message from Shavon Frances sent at 2/28/2023 11:53 AM CST -----  Regarding: Canceled appt  STEFAN Chamorro (case mgmt) called regarding the patients canceled appt.   Per the provider, appt was canceled bc he did not attend the previous appts.     Please call back @ [phone]

## 2023-07-21 DIAGNOSIS — J32.0 OROANTRAL FISTULA: Primary | ICD-10-CM

## 2023-08-16 LAB
INR PPP: 0.9
INR PPP: 1
PROTHROMBIN TIME: 12.3 SECONDS (ref 11.4–14)
PROTHROMBIN TIME: 12.8 SECONDS (ref 11.4–14)

## 2023-08-24 ENCOUNTER — HOSPITAL ENCOUNTER (OUTPATIENT)
Dept: RADIOLOGY | Facility: HOSPITAL | Age: 33
Discharge: HOME | End: 2023-08-24
Attending: INTERNAL MEDICINE
Payer: COMMERCIAL

## 2023-08-24 DIAGNOSIS — J32.0 OROANTRAL FISTULA: ICD-10-CM

## 2023-08-24 PROCEDURE — 70486 CT MAXILLOFACIAL W/O DYE: CPT | Mod: TC

## 2023-11-08 ENCOUNTER — OFFICE VISIT (OUTPATIENT)
Dept: OTOLARYNGOLOGY | Facility: CLINIC | Age: 33
End: 2023-11-08

## 2023-11-08 VITALS
HEART RATE: 76 BPM | SYSTOLIC BLOOD PRESSURE: 140 MMHG | TEMPERATURE: 98 F | RESPIRATION RATE: 16 BRPM | DIASTOLIC BLOOD PRESSURE: 92 MMHG | WEIGHT: 230 LBS | BODY MASS INDEX: 31.15 KG/M2 | HEIGHT: 72 IN

## 2023-11-08 DIAGNOSIS — J32.0 OROANTRAL FISTULA: Primary | ICD-10-CM

## 2023-11-08 PROCEDURE — 99213 OFFICE O/P EST LOW 20 MIN: CPT | Mod: PBBFAC | Performed by: OTOLARYNGOLOGY

## 2023-11-08 RX ORDER — BUSPIRONE HYDROCHLORIDE 15 MG/1
15 TABLET ORAL 3 TIMES DAILY
COMMUNITY

## 2023-11-08 RX ORDER — BUPROPION HYDROCHLORIDE 150 MG/1
150 TABLET ORAL DAILY
COMMUNITY

## 2023-11-08 NOTE — PROGRESS NOTES
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 He reports he had a R upper tooth removed about 8 months ago and since then has had episodes of liquid coming out of his nose with drinking and foul taste in his mouth when he blows his nose. He reports he was given antibiotics when the tooth was removed and again about 3 months ago in August 2023 and these symptoms have improved since but are not resolved. He also reports intermittent episodes of blurry vision. Reviewed CT from 8/24/2023 which showed inferior maxillary sinus opening consistent with these clinical findings. He is currently incarcerated.     ROS:   General: Negative except per HPI  Skin: Denies rash, ulcer, or lesion.  Eyes: Denies vision changes or diplopia.  Ears: Negative except per HPI  Nose: Negative except per HPI  Throat/mouth: Negative except per HPI  Cardiovascular: Negative except per HPI  Respiratory: Negative except per HPI  Neck: Negative except per HPI  Endocrine: Negative except per HPI  Neurologic: Negative except per HPI    Other 10-point review of systems negative except per HPI      Review of patient's allergies indicates:  No Known Allergies    Past Medical History:   Diagnosis Date    Depression        Past Surgical History:   Procedure Laterality Date    ADENOIDECTOMY      HERNIA REPAIR      TONSILLECTOMY      UVULECTOMY         Social History     Socioeconomic History    Marital status: Single   Tobacco Use    Smoking status: Former     Types: Cigarettes, Vaping with nicotine     Passive exposure: Past    Smokeless tobacco: Former   Substance and Sexual Activity    Alcohol use: Not Currently    Drug use: Yes     Types: Marijuana     Comment: opiates    Sexual  activity: Not Currently     Partners: Female       Family History   Problem Relation Age of Onset    Thyroid disease Mother     Hypertension Father     Kidney disease Father     Diabetes Father     Heart disease Father     No Known Problems Sister        Outpatient Encounter Medications as of 11/8/2023   Medication Sig Dispense Refill    buPROPion (WELLBUTRIN XL) 150 MG TB24 tablet Take 150 mg by mouth once daily.      busPIRone (BUSPAR) 15 MG tablet Take 15 mg by mouth 3 (three) times daily.      sertraline (ZOLOFT) 100 MG tablet Take 150 mg by mouth once daily.      sofosbuvir-velpatasvir 400-100 mg Tab Take 1 tablet by mouth once daily. (Patient not taking: Reported on 11/8/2023) 28 tablet 2     No facility-administered encounter medications on file as of 11/8/2023.       Physical Exam:  Vitals:    11/08/23 1042   BP: (!) 140/92   BP Location: Right arm   Patient Position: Sitting   BP Method: Medium (Automatic)   Pulse: 76   Resp: 16   Temp: 98.1 °F (36.7 °C)   TempSrc: Oral   Weight: 104.3 kg (230 lb)   Height: 6' (1.829 m)       Constitutional  General Appearance: well nourished, well-developed, AAO x3, NAD  HEENT  Eyes: PEERLA, EOMI, normal conjunctivae  Ears: Hearing well at conversation level; ortiz - no lateralization, Rinne AC > BC   AD: auricle normal, EAC normal, TM intact, no ANGELICA   AS: auricle normal, EAC normal, TM intact, no ANGELICA   Vestibular: ambulates without gait disturbance  Nose: septum midline, no inferior turbinate hypertrophy, no polyps, moist mucosa without erythema or blue hue  OC/OP: dentition moderate, slit-like opening R posterior maxilla without purulent drainage and mucosal edges appear to be healing, tongue/FOM/BOT- soft, no leukoplakia/ulcerations/ tenderness, tonsils removed, uvula removed   Nasopharynx, Hypopharynx, and Larynx:    Indirect: attempted, limited view due to patient intolerance  Neck: soft, non-tender, no palpable lymph nodes   Thyroid region- no nodules or  goiter  Neuro: CN II - XII intact bilaterally  Cardiovascular: peripheral pulses palpable  Respiratory: non-labored respirations  Psychiatric: oriented to time, place and person, no depression, anxiety or agitation      Imaging:   I personally reviewed the following images:    - CT sinuses without contrast 8/24/2023   FINDINGS:  Marked mucoperiosteal thickening of the right maxillary sinus and there is obliteration of the right ostiomeatal unit.  There is lesser mucoperiosteal thickening of left maxillary sinus and also associated retention cysts along the roof.  The left ostiomeatal unit is adequate.  The ethmoidal, frontal and the sphenoid sinuses are well pneumatized without mucoperiosteal thickening, network of septations, fluid retention cyst or polypoidal abnormality.  There are no orbital subperiosteal inflammations.     Bilateral unremarkable pneumatization of the visualized portion of the mastoid air cells.     Impression:     1. Bilateral maxillary chronic sinusitis changes.     2. Narrowed right ostiomeatal unit.      Assessment/Plan:  Bob Hook is a 32 y.o. 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 He reports he had a tooth removed about 8 months ago and since then has had episodes of liquid coming out of his nose with drinking and foul taste in his mouth when he blows his nose. Reviewed CT from 8/24/2023 which showed inferior maxillary sinus opening consistent with these clinical findings. Will try a third course of antibiotics to see if the lesion will continue healing on its on and will RTC in about 1 month to evaluate need for surgical intervention after antibiotic course.    - No surgical intervention needed at this time  - Augmentin 875 mg  - RTC in about 1 month      Montserrat Chamberlain, MS3  [facility]

## 2024-04-26 NOTE — PROGRESS NOTES
I have reviewed and agree with the resident's findings, including all diagnostic interpretations and plans as written.    Neeraj Price M.D.

## 2024-11-28 ENCOUNTER — HOSPITAL ENCOUNTER (EMERGENCY)
Facility: HOSPITAL | Age: 34
Discharge: HOME | End: 2024-11-29
Attending: INTERNAL MEDICINE
Payer: COMMERCIAL

## 2024-11-28 DIAGNOSIS — S80.02XA CONTUSION OF LEFT KNEE, INITIAL ENCOUNTER: Primary | ICD-10-CM

## 2024-11-28 DIAGNOSIS — S83.92XA SPRAIN OF LEFT KNEE, UNSPECIFIED LIGAMENT, INITIAL ENCOUNTER: ICD-10-CM

## 2024-11-28 LAB
ALBUMIN SERPL-MCNC: 4.2 G/DL (ref 3.5–5)
ALBUMIN/GLOB SERPL: 1.6 RATIO (ref 1.1–2)
ALP SERPL-CCNC: 61 UNIT/L (ref 40–150)
ALT SERPL-CCNC: 13 UNIT/L (ref 0–55)
ANION GAP SERPL CALC-SCNC: 9 MEQ/L
AST SERPL-CCNC: 20 UNIT/L (ref 5–34)
BASOPHILS # BLD AUTO: 0.01 X10(3)/MCL
BASOPHILS NFR BLD AUTO: 0.1 %
BILIRUB SERPL-MCNC: 0.3 MG/DL
BUN SERPL-MCNC: 16 MG/DL (ref 8.9–20.6)
CALCIUM SERPL-MCNC: 9.5 MG/DL (ref 8.4–10.2)
CHLORIDE SERPL-SCNC: 104 MMOL/L (ref 98–107)
CO2 SERPL-SCNC: 24 MMOL/L (ref 22–29)
CREAT SERPL-MCNC: 1.26 MG/DL (ref 0.72–1.25)
CREAT/UREA NIT SERPL: 13
EOSINOPHIL # BLD AUTO: 0.2 X10(3)/MCL (ref 0–0.9)
EOSINOPHIL NFR BLD AUTO: 2.4 %
ERYTHROCYTE [DISTWIDTH] IN BLOOD BY AUTOMATED COUNT: 11.7 % (ref 11.5–17)
GFR SERPLBLD CREATININE-BSD FMLA CKD-EPI: >60 ML/MIN/1.73/M2
GLOBULIN SER-MCNC: 2.7 GM/DL (ref 2.4–3.5)
GLUCOSE SERPL-MCNC: 102 MG/DL (ref 74–100)
HCT VFR BLD AUTO: 41.1 % (ref 42–52)
HGB BLD-MCNC: 14.3 G/DL (ref 14–18)
HOLD SPECIMEN: NORMAL
IMM GRANULOCYTES # BLD AUTO: 0.02 X10(3)/MCL (ref 0–0.04)
IMM GRANULOCYTES NFR BLD AUTO: 0.2 %
LYMPHOCYTES # BLD AUTO: 2.1 X10(3)/MCL (ref 0.6–4.6)
LYMPHOCYTES NFR BLD AUTO: 25.5 %
MCH RBC QN AUTO: 29 PG (ref 27–31)
MCHC RBC AUTO-ENTMCNC: 34.8 G/DL (ref 33–36)
MCV RBC AUTO: 83.4 FL (ref 80–94)
MONOCYTES # BLD AUTO: 0.48 X10(3)/MCL (ref 0.1–1.3)
MONOCYTES NFR BLD AUTO: 5.8 %
NEUTROPHILS # BLD AUTO: 5.44 X10(3)/MCL (ref 2.1–9.2)
NEUTROPHILS NFR BLD AUTO: 66 %
NRBC BLD AUTO-RTO: 0 %
PLATELET # BLD AUTO: 239 X10(3)/MCL (ref 130–400)
PMV BLD AUTO: 9.5 FL (ref 7.4–10.4)
POTASSIUM SERPL-SCNC: 3.8 MMOL/L (ref 3.5–5.1)
PROT SERPL-MCNC: 6.9 GM/DL (ref 6.4–8.3)
RBC # BLD AUTO: 4.93 X10(6)/MCL (ref 4.7–6.1)
SODIUM SERPL-SCNC: 137 MMOL/L (ref 136–145)
WBC # BLD AUTO: 8.25 X10(3)/MCL (ref 4.5–11.5)

## 2024-11-28 PROCEDURE — 25500020 PHARM REV CODE 255

## 2024-11-28 PROCEDURE — 63600175 PHARM REV CODE 636 W HCPCS: Performed by: INTERNAL MEDICINE

## 2024-11-28 PROCEDURE — 96374 THER/PROPH/DIAG INJ IV PUSH: CPT

## 2024-11-28 PROCEDURE — 85025 COMPLETE CBC W/AUTO DIFF WBC: CPT | Performed by: INTERNAL MEDICINE

## 2024-11-28 PROCEDURE — 80053 COMPREHEN METABOLIC PANEL: CPT | Performed by: INTERNAL MEDICINE

## 2024-11-28 PROCEDURE — 99285 EMERGENCY DEPT VISIT HI MDM: CPT | Mod: 25

## 2024-11-28 RX ORDER — DICLOFENAC SODIUM 75 MG/1
75 TABLET, DELAYED RELEASE ORAL 2 TIMES DAILY PRN
Qty: 30 TABLET | Refills: 0 | Status: SHIPPED | OUTPATIENT
Start: 2024-11-28

## 2024-11-28 RX ORDER — MORPHINE SULFATE 2 MG/ML
4 INJECTION, SOLUTION INTRAMUSCULAR; INTRAVENOUS
Status: COMPLETED | OUTPATIENT
Start: 2024-11-28 | End: 2024-11-28

## 2024-11-28 RX ADMIN — MORPHINE SULFATE 4 MG: 2 INJECTION, SOLUTION INTRAMUSCULAR; INTRAVENOUS at 10:11

## 2024-11-28 RX ADMIN — IOHEXOL 100 ML: 350 INJECTION, SOLUTION INTRAVENOUS at 10:11

## 2024-11-29 VITALS
OXYGEN SATURATION: 100 % | TEMPERATURE: 97 F | DIASTOLIC BLOOD PRESSURE: 99 MMHG | HEIGHT: 72 IN | WEIGHT: 240 LBS | HEART RATE: 100 BPM | BODY MASS INDEX: 32.51 KG/M2 | RESPIRATION RATE: 16 BRPM | SYSTOLIC BLOOD PRESSURE: 145 MMHG

## 2024-11-29 NOTE — DISCHARGE INSTRUCTIONS
Pt is discharge with diagnosis of Left knee sprain grade 2 with contusion. Recommendations for Diclofenac sodium 75 mg BIP prn pain, use knee immobilizer and crutches until F/U by orthopedic surgery service provided to [facility] through communications form. Prisoner also informed about  his conditions and recommendations. Pt must return to ED if condition changes or worsening symptoms.

## 2024-11-29 NOTE — ED PROVIDER NOTES
"Encounter Date: 11/28/2024       History     Chief Complaint   Patient presents with    Leg Injury     To Triage with [facility] Officer in full criminal restraints.  States was horse playing during game and fell onto left leg.  Presents with pain and edema to left leg concentrated at knee.  PPP but weaker than right.  Slight skin discoloration and coolness.       Presents with severe left knee pain after injury at custodial today. States fell and "leg went one way and knee the other".  Denies prior knee problems, unable to walk due to pain, unable to move the knee due to pain    The history is provided by the patient, the EMS personnel and the police.     Review of patient's allergies indicates:  No Known Allergies  Past Medical History:   Diagnosis Date    ADD (attention deficit disorder) 10/31/2012    Anxiety     Behavioral problem     History of psychiatric care     Hypertension     Psychiatric exam     Psychiatric problem     Therapy      History reviewed. No pertinent surgical history.  Family History   Problem Relation Name Age of Onset    Depression Sister      Anxiety disorder Sister       Social History     Tobacco Use    Smoking status: Former     Current packs/day: 0.25     Average packs/day: 0.3 packs/day for 3.0 years (0.8 ttl pk-yrs)     Types: Cigarettes    Smokeless tobacco: Current   Substance Use Topics    Alcohol use: Not Currently     Alcohol/week: 5.0 standard drinks of alcohol     Types: 5 Cans of beer per week    Drug use: No     Review of Systems   Musculoskeletal:  Positive for arthralgias, gait problem and joint swelling.       Physical Exam     Initial Vitals [11/28/24 2137]   BP Pulse Resp Temp SpO2   (!) 145/99 100 20 97.2 °F (36.2 °C) 100 %      MAP       --         Physical Exam    Nursing note and vitals reviewed.  Constitutional: He appears well-developed and well-nourished.   HENT:   Head: Normocephalic and atraumatic. Mouth/Throat: Oropharynx is clear and moist.   Eyes: Conjunctivae are " normal. Pupils are equal, round, and reactive to light.   Neck: Neck supple.   Normal range of motion.  Cardiovascular:  Normal rate, regular rhythm, normal heart sounds and intact distal pulses.           Pulmonary/Chest: Breath sounds normal. No respiratory distress.   Abdominal: Abdomen is soft. Bowel sounds are normal. He exhibits no distension. There is no abdominal tenderness. There is no rebound and no guarding.   Musculoskeletal:         General: Tenderness present. No edema.      Cervical back: Normal range of motion and neck supple.      Comments: Unable to move left knee due to pain, full AROM of hip and ankle w/o problems. +1 dorsal pedis b/l, cold toes with delay CRF but neurologically intact. Knee affusion palpable, normal skin     Neurological: He is alert and oriented to person, place, and time. He has normal strength. GCS score is 15. GCS eye subscore is 4. GCS verbal subscore is 5. GCS motor subscore is 6.   Skin: Skin is warm and dry. No rash noted.   Psychiatric: His behavior is normal. Thought content normal.         ED Course   Procedures  Labs Reviewed   COMPREHENSIVE METABOLIC PANEL - Abnormal       Result Value    Sodium 137      Potassium 3.8      Chloride 104      CO2 24      Glucose 102 (*)     Blood Urea Nitrogen 16.0      Creatinine 1.26 (*)     Calcium 9.5      Protein Total 6.9      Albumin 4.2      Globulin 2.7      Albumin/Globulin Ratio 1.6      Bilirubin Total 0.3      ALP 61      ALT 13      AST 20      eGFR >60      Anion Gap 9.0      BUN/Creatinine Ratio 13     CBC WITH DIFFERENTIAL - Abnormal    WBC 8.25      RBC 4.93      Hgb 14.3      Hct 41.1 (*)     MCV 83.4      MCH 29.0      MCHC 34.8      RDW 11.7      Platelet 239      MPV 9.5      Neut % 66.0      Lymph % 25.5      Mono % 5.8      Eos % 2.4      Basophil % 0.1      Lymph # 2.10      Neut # 5.44      Mono # 0.48      Eos # 0.20      Baso # 0.01      IG# 0.02      IG% 0.2      NRBC% 0.0     CBC W/ AUTO DIFFERENTIAL     Narrative:     The following orders were created for panel order CBC auto differential.  Procedure                               Abnormality         Status                     ---------                               -----------         ------                     CBC with Differential[[id number]]       Abnormal            Final result                 Please view results for these tests on the individual orders.   EXTRA TUBES    Narrative:     The following orders were created for panel order EXTRA TUBES.  Procedure                               Abnormality         Status                     ---------                               -----------         ------                     Light Blue Top Hold[[id number]]                             Final result               Light Green Top Hold[[id number]]                            Final result               Gold Top Hold[[id number]]                                   Final result                 Please view results for these tests on the individual orders.   LIGHT BLUE TOP HOLD    Extra Tube Hold for add-ons.     LIGHT GREEN TOP HOLD    Extra Tube Hold for add-ons.     GOLD TOP HOLD    Extra Tube Hold for add-ons.            Imaging Results              CT Knee W W/O Contrast Left (Preliminary result)  Result time 11/28/24 23:13:52      Preliminary result by Ace Paredes MD (11/28/24 23:13:52)                   Narrative:    START OF REPORT:  Technique: Left hip knee CT was performed with intravenous contrast with direct axial as well as sagittal and coronal reconstruction images.    Comparison: None.    Clinical history: Knee trauma, dislocation suspected , Leg Injury(To Triage with [facility] Officer in full criminal restraints. States was horse playing during game and fell onto left leg. Presents with pain and edema to left leg concentrated at knee. PPP but weaker than right. Slight skin discoloration and coolness. ).    Findings:  Bones:  Femur: The visualized femur appears  unremarkable.  Knee: The visualized bony structures of the left knee appear unremarkable with no acute fracture, dislocation or subluxation. There may be some soft tissue stranding anterior to the patella which may reflect contusion.  Tibia: The visualized tibia appears unremarkable.  Fibula: The visualized fibula appears unremarkable.    Miscellaneous: This may represent an element of calcific tenditis. Correlate with clinical and laboratory parameters as regards further evaluation and follow up.      Impression:  1. The visualized bony structures of the left knee appear unremarkable with no acute fracture, dislocation or subluxation.  2. Details and other findings as above.                                         Medications   morphine injection 4 mg (4 mg Intravenous Given 11/28/24 2200)   iohexoL (OMNIPAQUE 350) 350 mg iodine/mL injection (100 mLs  Given 11/28/24 2238)     Medical Decision Making  Amount and/or Complexity of Data Reviewed  Labs: ordered. Decision-making details documented in ED Course.  Radiology: ordered and independent interpretation performed. Decision-making details documented in ED Course.    Risk  Prescription drug management.      Additional MDM:   Differential Diagnosis:   Differential diagnosis includes fracture, sprain, strain, contusion among others                                       Clinical Impression:  Final diagnoses:  [S80.02XA] Contusion of left knee, initial encounter (Primary)  [S83.92XA] Sprain of left knee, unspecified ligament, initial encounter          ED Disposition Condition    Discharge Stable          ED Prescriptions       Medication Sig Dispense Start Date End Date Auth. Provider    diclofenac (VOLTAREN) 75 MG EC tablet Take 1 tablet (75 mg total) by mouth 2 (two) times daily as needed (Pain). 30 tablet 11/28/2024 -- Barry Fraser MD          Follow-up Information       Follow up With Specialties Details Why Contact Info    [facility] -  Emergency Dept Emergency Medicine  If symptoms worsen [address] [facility] [id number] [phone]             Barry Fraser MD  11/28/24 4735

## 2024-11-29 NOTE — ED NOTES
MD Fran instructed nurse to call residential and let them know patient needs a knee immobilizer and a refer to an orthopedic clinic after radiologist re-read ct report this morning.

## 2024-12-09 DIAGNOSIS — M25.562 LEFT KNEE PAIN: Primary | ICD-10-CM

## 2024-12-12 ENCOUNTER — HOSPITAL ENCOUNTER (OUTPATIENT)
Dept: RADIOLOGY | Facility: HOSPITAL | Age: 34
Discharge: HOME | End: 2024-12-12
Attending: INTERNAL MEDICINE
Payer: COMMERCIAL

## 2024-12-12 DIAGNOSIS — M25.562 LEFT KNEE PAIN: ICD-10-CM

## 2024-12-12 PROCEDURE — 73721 MRI JNT OF LWR EXTRE W/O DYE: CPT | Mod: TC,LT

## 2024-12-23 DIAGNOSIS — M25.562 LEFT KNEE PAIN: Primary | ICD-10-CM

## 2025-01-29 ENCOUNTER — OFFICE VISIT (OUTPATIENT)
Dept: ORTHOPEDICS | Facility: CLINIC | Age: 35
End: 2025-01-29
Payer: COMMERCIAL

## 2025-01-29 VITALS
BODY MASS INDEX: 32.52 KG/M2 | HEIGHT: 72 IN | DIASTOLIC BLOOD PRESSURE: 92 MMHG | WEIGHT: 240.06 LBS | TEMPERATURE: 98 F | SYSTOLIC BLOOD PRESSURE: 135 MMHG | HEART RATE: 72 BPM

## 2025-01-29 DIAGNOSIS — S83.512A RUPTURE OF ANTERIOR CRUCIATE LIGAMENT OF LEFT KNEE, INITIAL ENCOUNTER: Primary | ICD-10-CM

## 2025-01-29 DIAGNOSIS — M25.562 LEFT KNEE PAIN: ICD-10-CM

## 2025-01-29 PROCEDURE — 99215 OFFICE O/P EST HI 40 MIN: CPT | Mod: PBBFAC

## 2025-01-29 RX ORDER — MUPIROCIN 20 MG/G
OINTMENT TOPICAL
OUTPATIENT
Start: 2025-01-29

## 2025-01-29 NOTE — PROGRESS NOTES
[facility]  Established Patient Office Visit  01/29/2025       Patient ID: Bob Hook  YOB: 1990  MRN: [id number]    Chief Complaint: Injury of the Left Knee (Patient is here today for  Left Knee .  Was this an injury  yes November 27, 2024 .How did it happen fell playing football in dorm. Taking mobic 7 1/2 in the morning and 7 1/2 in the afternoon not very effective.  Patient has also been doing elevation . Patient is using braces/)      Past Orthopaedic Surgeries: NA    HPI:  Bob Hook is a 34 y.o. male here for evaluation of his left knee.  Patient states that on November 27, 2024 he was playing football and sustained an injury to his left knee.  Patient states that his knee went 1 way and the rest of his leg with the other way.  Patient was seen in the ED and a CT scan and MRI were ordered which demonstrated a multi ligamentous knee injury.  He has been using a knee immobilizer since the injury.  He states that he has significant pain and instability of his left knee.  He states that he tries to keep as much weight off of the knee as possible because it feels grossly unstable.  He denies any significant medical history.  Denies previous injury to the knee    12 point ROS performed and negative except as above.     Past Medical History:    Past Medical History:   Diagnosis Date    ADD (attention deficit disorder) 10/31/2012    Anxiety     Behavioral problem     Depression     History of psychiatric care     Hypertension     Psychiatric exam     Psychiatric problem     Therapy      Past Surgical History:   Procedure Laterality Date    ADENOIDECTOMY      HERNIA REPAIR      TONSILLECTOMY      UVULECTOMY       Family History   Problem Relation Name Age of Onset    Depression Sister      Anxiety disorder Sister      Thyroid disease Mother      Hypertension Father      Kidney disease Father      Diabetes Father      Heart disease Father      No Known Problems Sister        Social History     Socioeconomic History    Marital status: Single    Number of children: 0   Occupational History    Occupation:       Employer: [employer]   Tobacco Use    Smoking status: Former     Current packs/day: 0.25     Average packs/day: 0.3 packs/day for 3.0 years (0.8 ttl pk-yrs)     Types: Cigarettes, Vaping with nicotine     Passive exposure: Past    Smokeless tobacco: Former   Substance and Sexual Activity    Alcohol use: Not Currently     Alcohol/week: 5.0 standard drinks of alcohol     Types: 5 Cans of beer per week    Drug use: No     Types: Marijuana     Comment: opiates    Sexual activity: Yes     Partners: Female     Birth control/protection: OCP   Other Topics Concern    Caffeine Use: Excessive Yes    Legal: Other No    Leisure: Exercise Yes    Leisure: Fishing Yes    Childhood History: Raised by parents Yes    Leisure: Hunting Yes    Leisure: Movie Watching Yes    Leisure: Sports Yes    Education: High School Graduate Yes    Education: Unfinished college Yes    Home situation: lives with family Yes     Service Yes    Spirituality: Active Participation No    Spirituality: Organized Gnosticist No    Spirituality: Private Participation No    Patient feels they ought to cut down on drinking/drug use No    Legal: Arrest history No    Patient annoyed by others criticizing their drinking/drug use No    Legal: Involved in civil litigation No    Patient has felt bad or guilty about drinking/drug use No    Financial Status: Employed Yes    Legal: Involved in criminal litigation No    Patient has had a drink/used drugs as an eye opener in the AM No   Social History Narrative    ** Merged History Encounter **         - [employer] since [employer], 2011.  [employer].  No arrests or DUI.  No court dates pending.  In a 2 year heterosexual relationship,  No children, Born and raised in [place], [place],  Enjoys hunting and fishing and outdoors.  Strength- family supportive and loving.        Medication List with Changes/Refills   Current Medications    BUPROPION (WELLBUTRIN XL) 150 MG TB24 TABLET    Take 150 mg by mouth 3 (three) times daily.    BUPROPION (WELLBUTRIN XL) 150 MG TB24 TABLET    Take 150 mg by mouth once daily.    BUSPIRONE (BUSPAR) 15 MG TABLET    Take 15 mg by mouth 3 (three) times daily.    DEXTROAMPHETAMINE-AMPHETAMINE (AMPHETAMINE SALT COMBO) 20 MG TAB    Take 20 mg by mouth 2 (two) times daily.    DICLOFENAC (VOLTAREN) 75 MG EC TABLET    Take 1 tablet (75 mg total) by mouth 2 (two) times daily as needed (Pain).    SERTRALINE (ZOLOFT) 100 MG TABLET    Take 150 mg by mouth once daily.    SOFOSBUVIR-VELPATASVIR 400-100 MG TAB    Take 1 tablet by mouth once daily.     Review of patient's allergies indicates:  No Known Allergies    Physical Exam:    Left knee  Significant guarding throughout the exam which limits the findings  Gross tenderness to palpation throughout the knee  Able to do a straight leg raise  Knee range of motion from 0-40 degrees active  Laxity and pain felt on varus stress of the knee at both 0 and 30°  No significant laxity on valgus stress but painful  2B Lachman's      Imaging independently interpreted:  MRI of the left knee obtained previously on 12/09/2024 demonstrates a multi ligamentous knee injury with full-thickness tear of the ACL; questionable tear of PCL; fibular collateral ligament tear; MCL tear; posterior joint capsule tear; complex tear of lateral meniscus body and posterior horn ; Small tear of medial meniscus posterior horn     Assessment and Plan:    Bob Hook is a 34 y.o. male who sustained a multi ligamentous knee injury on 11/27/2024    Case discussed with Dr. Hurtado  We will plan for ACL reconstruction with patella tendon autograft, LCL reconstruction with allograft (williamson); lateral meniscus repair versus debridement; possible MCL reconstruction  We will use fluoroscopy during surgery to perform stress x-rays  We will give patient a hinged  knee brace today to get him started on range of motion  Okay to lock brace in extension when ambulating  Return to clinic after surgery  Consent day of surgery  Tentatively plan for surgery on 03/25/2025    Noel Benavidez, PGY-3  [employer] Orthopaedic Surgery

## 2025-01-29 NOTE — PROGRESS NOTES
Faculty Attestation: Bob Hook  was seen at [facility] in the Orthopaedic Clinic. History of Present Illness, Physical Exam, and Assessment and Plan reviewed. Treatment plan is reasonable and appropriate. Compliance with treatment recommendations is important. Discussed with the resident at the time of the visit.  No procedure was performed.     Timothy Hurtado Jr, MD  Orthopaedic Surgery

## 2025-03-11 ENCOUNTER — ANESTHESIA EVENT (OUTPATIENT)
Dept: SURGERY | Facility: HOSPITAL | Age: 35
End: 2025-03-11
Payer: COMMERCIAL

## 2025-03-11 NOTE — ANESTHESIA PREPROCEDURE EVALUATION
Bob Hook is a 34 y.o. male presenting for RECONSTRUCTION, KNEE, ACL, ARTHROSCOPIC (Left: Knee)       Reconstruction, LIGAMENT, KNEE, COLLATERAL (Left)       ARTHROSCOPY,KNEE,WITH MENISCUS REPAIR (Left) with a history of   -Rupture of anterior cruciate ligament of left knee  -former smoker  -5 cans beer weekly  -anxiety/depression/ADD  -HTN  -h/o treated HCV/mild hepatic steatosis (US 10/7/22)  -h/o IVDU (heroine and opiates)     BETA-BLOCKER: none    New Orders for Anesthesia: none    Active Ambulatory Problems     Diagnosis Date Noted    ADD (attention deficit disorder) 10/31/2012    Scrotal pain 02/27/2023     Resolved Ambulatory Problems     Diagnosis Date Noted    No Resolved Ambulatory Problems     Past Medical History:   Diagnosis Date    Anxiety     Behavioral problem     Depression     History of psychiatric care     Hypertension     Psychiatric exam     Psychiatric problem     Therapy        Pre-op Assessment    I have reviewed the NPO Status.      Review of Systems  Anesthesia Hx:  No problems with previous Anesthesia                Social:  Former Smoker, Recreational Drugs       Cardiovascular:     Hypertension, poorly controlled                                          Pulmonary:  Pulmonary Normal                       Renal/:  Renal/ Normal                 Hepatic/GI:       Hepatitis, C              Neurological:  Neurology Normal                                      Endocrine:        Obesity / BMI > 30  Psych:   anxiety depression              Vitals:    03/25/25 0515 03/25/25 0525   BP:  (!) 138/107   Pulse:  73   Temp:  36.4 °C (97.5 °F)   TempSrc:  Oral   SpO2:  98%   Weight: 102.4 kg (225 lb 12.8 oz)          Physical Exam  General: Alert, Cooperative and Well nourished    Airway:  Mallampati: II   Mouth Opening: Normal  TM Distance: Normal  Tongue: Normal  Neck ROM: Normal ROM    Dental:  Intact    Chest/Lungs:  Clear to auscultation, Normal Respiratory Rate    Heart:  Rate:  Normal  Rhythm: Regular Rhythm  Sounds: Normal      Lab Results   Component Value Date    WBC 8.25 11/28/2024    HGB 14.3 11/28/2024    HCT 41.1 (L) 11/28/2024    MCV 83.4 11/28/2024     11/28/2024       CMP  Sodium   Date Value Ref Range Status   11/28/2024 137 136 - 145 mmol/L Final   12/29/2010 141 136 - 145 mmol/L Final     Potassium   Date Value Ref Range Status   11/28/2024 3.8 3.5 - 5.1 mmol/L Final   12/29/2010 3.8 3.5 - 5.1 mmol/L Final     Chloride   Date Value Ref Range Status   11/28/2024 104 98 - 107 mmol/L Final   12/29/2010 104 95 - 110 mmol/L Final     CO2   Date Value Ref Range Status   11/28/2024 24 22 - 29 mmol/L Final   12/29/2010 26 23.0 - 29.0 mmol/L Final     Glucose   Date Value Ref Range Status   12/29/2010 103 70 - 110 mg/dl Final     BUN   Date Value Ref Range Status   12/29/2010 10 6 - 20 mg/dl Final     Blood Urea Nitrogen   Date Value Ref Range Status   11/28/2024 16.0 8.9 - 20.6 mg/dL Final     Creatinine   Date Value Ref Range Status   11/28/2024 1.26 (H) 0.72 - 1.25 mg/dL Final   12/29/2010 0.8 0.5 - 1.4 mg/dl Final     Calcium   Date Value Ref Range Status   11/28/2024 9.5 8.4 - 10.2 mg/dL Final   12/29/2010 9.4 8.7 - 10.5 mg/dl Final     Total Protein   Date Value Ref Range Status   12/29/2010 7.3 6.0 - 8.4 g/dL Final     Albumin   Date Value Ref Range Status   11/28/2024 4.2 3.5 - 5.0 g/dL Final   12/29/2010 4.2 3.5 - 5.2 g/dl Final     Total Bilirubin   Date Value Ref Range Status   12/29/2010 1.1 (H) 0.1 - 1.0 mg/dl Final     Comment:     For infants and newborns, interpretation of results should be based  on gestational age, weight and in agreement with clinical  observations.  .  Premature Infant recommended reference ranges:  Up to 24 hours.............<8.0 mg/dl  Up to 48 hours............<12.0 mg/dl  3-5 days..................<15.0 mg/dl  6-29 days.................<15.0 mg/dl     Bilirubin Total   Date Value Ref Range Status   11/28/2024 0.3 <=1.5 mg/dL Final      Alkaline Phosphatase   Date Value Ref Range Status   12/29/2010 57 55 - 135 U/L Final     ALP   Date Value Ref Range Status   11/28/2024 61 40 - 150 unit/L Final     AST   Date Value Ref Range Status   11/28/2024 20 5 - 34 unit/L Final   12/29/2010 20 10 - 40 U/L Final     ALT   Date Value Ref Range Status   11/28/2024 13 0 - 55 unit/L Final   12/29/2010 16 10 - 44 U/L Final     Anion Gap   Date Value Ref Range Status   12/29/2010 16 10 - 20 mmol/L Final     eGFR   Date Value Ref Range Status   11/28/2024 >60 mL/min/1.73/m2 Final     Lab Results   Component Value Date    INR 1.0 12/02/2022    INR 0.9 09/09/2022    INR 0.98 12/17/2021         Anesthesia Plan  Type of Anesthesia, risks & benefits discussed:    Anesthesia Type: Gen Supraglottic Airway, Regional  Intra-op Monitoring Plan: Standard ASA Monitors  Post Op Pain Control Plan: IV/PO Opioids PRN  Induction:  IV  Airway Plan: Direct  Informed Consent: Informed consent signed with the Patient and all parties understand the risks and agree with anesthesia plan.  All questions answered.   ASA Score: 2  Day of Surgery Review of History & Physical: H&P Update referred to the surgeon/provider.    Ready For Surgery From Anesthesia Perspective.     .

## 2025-03-17 RX ORDER — AMLODIPINE BESYLATE 10 MG/1
10 TABLET ORAL DAILY
COMMUNITY

## 2025-03-17 RX ORDER — MELOXICAM 7.5 MG/1
7.5 TABLET ORAL 2 TIMES DAILY
COMMUNITY

## 2025-03-25 ENCOUNTER — HOSPITAL ENCOUNTER (OUTPATIENT)
Facility: HOSPITAL | Age: 35
Discharge: HOME | End: 2025-03-25
Attending: ORTHOPAEDIC SURGERY | Admitting: ORTHOPAEDIC SURGERY
Payer: COMMERCIAL

## 2025-03-25 ENCOUNTER — ANESTHESIA (OUTPATIENT)
Dept: SURGERY | Facility: HOSPITAL | Age: 35
End: 2025-03-25
Payer: COMMERCIAL

## 2025-03-25 VITALS
SYSTOLIC BLOOD PRESSURE: 132 MMHG | WEIGHT: 225.81 LBS | OXYGEN SATURATION: 96 % | HEART RATE: 97 BPM | TEMPERATURE: 98 F | RESPIRATION RATE: 18 BRPM | DIASTOLIC BLOOD PRESSURE: 73 MMHG | BODY MASS INDEX: 30.62 KG/M2

## 2025-03-25 DIAGNOSIS — S83.512A RUPTURE OF ANTERIOR CRUCIATE LIGAMENT OF LEFT KNEE, INITIAL ENCOUNTER: ICD-10-CM

## 2025-03-25 DIAGNOSIS — M25.562 LEFT KNEE PAIN: ICD-10-CM

## 2025-03-25 PROCEDURE — 27405 REPAIR OF KNEE LIGAMENT: CPT | Mod: 51,LT,, | Performed by: ORTHOPAEDIC SURGERY

## 2025-03-25 PROCEDURE — 63600175 PHARM REV CODE 636 W HCPCS: Performed by: ANESTHESIOLOGY

## 2025-03-25 PROCEDURE — 27201423 OPTIME MED/SURG SUP & DEVICES STERILE SUPPLY: Performed by: ORTHOPAEDIC SURGERY

## 2025-03-25 PROCEDURE — 25000003 PHARM REV CODE 250: Performed by: NURSE ANESTHETIST, CERTIFIED REGISTERED

## 2025-03-25 PROCEDURE — 64447 NJX AA&/STRD FEMORAL NRV IMG: CPT | Performed by: ANESTHESIOLOGY

## 2025-03-25 PROCEDURE — 71000033 HC RECOVERY, INTIAL HOUR: Performed by: ORTHOPAEDIC SURGERY

## 2025-03-25 PROCEDURE — 63600175 PHARM REV CODE 636 W HCPCS: Performed by: NURSE ANESTHETIST, CERTIFIED REGISTERED

## 2025-03-25 PROCEDURE — D9220A PRA ANESTHESIA: Mod: CRNA,,, | Performed by: NURSE ANESTHETIST, CERTIFIED REGISTERED

## 2025-03-25 PROCEDURE — D9220A PRA ANESTHESIA: Mod: ANES,,, | Performed by: ANESTHESIOLOGY

## 2025-03-25 PROCEDURE — 29888 ARTHRS AID ACL RPR/AGMNTJ: CPT | Mod: LT,,, | Performed by: ORTHOPAEDIC SURGERY

## 2025-03-25 PROCEDURE — 71000015 HC POSTOP RECOV 1ST HR: Performed by: ORTHOPAEDIC SURGERY

## 2025-03-25 PROCEDURE — C1713 ANCHOR/SCREW BN/BN,TIS/BN: HCPCS | Performed by: ORTHOPAEDIC SURGERY

## 2025-03-25 PROCEDURE — 71000016 HC POSTOP RECOV ADDL HR: Performed by: ORTHOPAEDIC SURGERY

## 2025-03-25 PROCEDURE — 63600175 PHARM REV CODE 636 W HCPCS: Mod: JZ,TB | Performed by: NURSE ANESTHETIST, CERTIFIED REGISTERED

## 2025-03-25 PROCEDURE — 37000009 HC ANESTHESIA EA ADD 15 MINS: Performed by: ORTHOPAEDIC SURGERY

## 2025-03-25 PROCEDURE — 25000003 PHARM REV CODE 250: Performed by: ANESTHESIOLOGY

## 2025-03-25 PROCEDURE — 36000711: Performed by: ORTHOPAEDIC SURGERY

## 2025-03-25 PROCEDURE — 36000710: Performed by: ORTHOPAEDIC SURGERY

## 2025-03-25 PROCEDURE — C1762 CONN TISS, HUMAN(INC FASCIA): HCPCS | Performed by: ORTHOPAEDIC SURGERY

## 2025-03-25 PROCEDURE — 63600175 PHARM REV CODE 636 W HCPCS: Performed by: ORTHOPAEDIC SURGERY

## 2025-03-25 PROCEDURE — 37000008 HC ANESTHESIA 1ST 15 MINUTES: Performed by: ORTHOPAEDIC SURGERY

## 2025-03-25 DEVICE — BTB TIGHTROPE W/ DEPLOYING SUTURE
Type: IMPLANTABLE DEVICE | Site: KNEE | Status: FUNCTIONAL
Brand: ARTHREX®

## 2025-03-25 RX ORDER — ONDANSETRON HYDROCHLORIDE 2 MG/ML
4 INJECTION, SOLUTION INTRAVENOUS DAILY PRN
Status: DISCONTINUED | OUTPATIENT
Start: 2025-03-25 | End: 2025-03-25 | Stop reason: HOSPADM

## 2025-03-25 RX ORDER — ACETAMINOPHEN 500 MG
500 TABLET ORAL EVERY 6 HOURS PRN
Qty: 30 TABLET | Refills: 0 | Status: SHIPPED | OUTPATIENT
Start: 2025-03-25

## 2025-03-25 RX ORDER — MIDAZOLAM HYDROCHLORIDE 2 MG/2ML
5 INJECTION, SOLUTION INTRAMUSCULAR; INTRAVENOUS ONCE AS NEEDED
Status: COMPLETED | OUTPATIENT
Start: 2025-03-25 | End: 2025-03-25

## 2025-03-25 RX ORDER — FENTANYL CITRATE 50 UG/ML
INJECTION, SOLUTION INTRAMUSCULAR; INTRAVENOUS
Status: DISCONTINUED | OUTPATIENT
Start: 2025-03-25 | End: 2025-03-25

## 2025-03-25 RX ORDER — EPINEPHRINE 1 MG/ML
INJECTION, SOLUTION, CONCENTRATE INTRAVENOUS
Status: DISCONTINUED | OUTPATIENT
Start: 2025-03-25 | End: 2025-03-25 | Stop reason: HOSPADM

## 2025-03-25 RX ORDER — SODIUM CHLORIDE, SODIUM LACTATE, POTASSIUM CHLORIDE, CALCIUM CHLORIDE 600; 310; 30; 20 MG/100ML; MG/100ML; MG/100ML; MG/100ML
INJECTION, SOLUTION INTRAVENOUS CONTINUOUS
OUTPATIENT
Start: 2025-03-25

## 2025-03-25 RX ORDER — SODIUM CHLORIDE 0.9 % (FLUSH) 0.9 %
10 SYRINGE (ML) INJECTION
Status: DISCONTINUED | OUTPATIENT
Start: 2025-03-25 | End: 2025-03-25 | Stop reason: HOSPADM

## 2025-03-25 RX ORDER — CEFAZOLIN SODIUM 1 G/3ML
INJECTION, POWDER, FOR SOLUTION INTRAMUSCULAR; INTRAVENOUS
Status: DISCONTINUED | OUTPATIENT
Start: 2025-03-25 | End: 2025-03-25

## 2025-03-25 RX ORDER — ASPIRIN 81 MG/1
81 TABLET ORAL DAILY
Qty: 30 TABLET | Refills: 0 | Status: SHIPPED | OUTPATIENT
Start: 2025-03-25 | End: 2025-04-24

## 2025-03-25 RX ORDER — DEXAMETHASONE SODIUM PHOSPHATE 4 MG/ML
INJECTION, SOLUTION INTRA-ARTICULAR; INTRALESIONAL; INTRAMUSCULAR; INTRAVENOUS; SOFT TISSUE
Status: DISCONTINUED | OUTPATIENT
Start: 2025-03-25 | End: 2025-03-25

## 2025-03-25 RX ORDER — OXYCODONE AND ACETAMINOPHEN 5; 325 MG/1; MG/1
1 TABLET ORAL ONCE
Refills: 0 | Status: COMPLETED | OUTPATIENT
Start: 2025-03-25 | End: 2025-03-25

## 2025-03-25 RX ORDER — OXYCODONE HYDROCHLORIDE 5 MG/1
5 TABLET ORAL EVERY 4 HOURS PRN
Qty: 30 TABLET | Refills: 0 | Status: SHIPPED | OUTPATIENT
Start: 2025-03-25 | End: 2025-04-01

## 2025-03-25 RX ORDER — PROPOFOL 10 MG/ML
VIAL (ML) INTRAVENOUS
Status: DISCONTINUED | OUTPATIENT
Start: 2025-03-25 | End: 2025-03-25

## 2025-03-25 RX ORDER — ONDANSETRON HYDROCHLORIDE 2 MG/ML
INJECTION, SOLUTION INTRAVENOUS
Status: DISCONTINUED | OUTPATIENT
Start: 2025-03-25 | End: 2025-03-25

## 2025-03-25 RX ORDER — EPHEDRINE SULFATE 50 MG/ML
INJECTION, SOLUTION INTRAVENOUS
Status: DISCONTINUED | OUTPATIENT
Start: 2025-03-25 | End: 2025-03-25

## 2025-03-25 RX ORDER — GLUCAGON 1 MG
1 KIT INJECTION
Status: DISCONTINUED | OUTPATIENT
Start: 2025-03-25 | End: 2025-03-25 | Stop reason: HOSPADM

## 2025-03-25 RX ORDER — GABAPENTIN 300 MG/1
300 CAPSULE ORAL 3 TIMES DAILY
Qty: 90 CAPSULE | Refills: 0 | Status: SHIPPED | OUTPATIENT
Start: 2025-03-25 | End: 2025-04-24

## 2025-03-25 RX ORDER — MELOXICAM 15 MG/1
15 TABLET ORAL DAILY
Qty: 30 TABLET | Refills: 0 | Status: SHIPPED | OUTPATIENT
Start: 2025-03-25

## 2025-03-25 RX ORDER — VANCOMYCIN HYDROCHLORIDE 1 G/20ML
INJECTION, POWDER, LYOPHILIZED, FOR SOLUTION INTRAVENOUS
Status: DISCONTINUED | OUTPATIENT
Start: 2025-03-25 | End: 2025-03-25 | Stop reason: HOSPADM

## 2025-03-25 RX ORDER — ROPIVACAINE HYDROCHLORIDE 5 MG/ML
INJECTION, SOLUTION EPIDURAL; INFILTRATION; PERINEURAL
Status: COMPLETED | OUTPATIENT
Start: 2025-03-25 | End: 2025-03-25

## 2025-03-25 RX ORDER — KETAMINE HCL IN 0.9 % NACL 50 MG/5 ML
SYRINGE (ML) INTRAVENOUS
Status: DISCONTINUED | OUTPATIENT
Start: 2025-03-25 | End: 2025-03-25

## 2025-03-25 RX ORDER — MORPHINE SULFATE 2 MG/ML
2 INJECTION, SOLUTION INTRAMUSCULAR; INTRAVENOUS EVERY 5 MIN PRN
Status: DISCONTINUED | OUTPATIENT
Start: 2025-03-25 | End: 2025-03-25 | Stop reason: HOSPADM

## 2025-03-25 RX ORDER — MUPIROCIN 20 MG/G
OINTMENT TOPICAL
Status: DISCONTINUED | OUTPATIENT
Start: 2025-03-25 | End: 2025-03-25 | Stop reason: HOSPADM

## 2025-03-25 RX ORDER — LIDOCAINE HYDROCHLORIDE 20 MG/ML
INJECTION, SOLUTION EPIDURAL; INFILTRATION; INTRACAUDAL; PERINEURAL
Status: DISCONTINUED | OUTPATIENT
Start: 2025-03-25 | End: 2025-03-25

## 2025-03-25 RX ORDER — METHOCARBAMOL 500 MG/1
500 TABLET, FILM COATED ORAL 3 TIMES DAILY
Qty: 30 TABLET | Refills: 0 | Status: SHIPPED | OUTPATIENT
Start: 2025-03-25 | End: 2025-04-04

## 2025-03-25 RX ORDER — DIPHENHYDRAMINE HYDROCHLORIDE 50 MG/ML
INJECTION, SOLUTION INTRAMUSCULAR; INTRAVENOUS
Status: DISCONTINUED
Start: 2025-03-25 | End: 2025-03-25 | Stop reason: HOSPADM

## 2025-03-25 RX ORDER — DIPHENHYDRAMINE HYDROCHLORIDE 50 MG/ML
12.5 INJECTION, SOLUTION INTRAMUSCULAR; INTRAVENOUS ONCE
Status: COMPLETED | OUTPATIENT
Start: 2025-03-25 | End: 2025-03-25

## 2025-03-25 RX ORDER — OXYCODONE AND ACETAMINOPHEN 5; 325 MG/1; MG/1
1 TABLET ORAL
Status: DISCONTINUED | OUTPATIENT
Start: 2025-03-25 | End: 2025-03-25 | Stop reason: HOSPADM

## 2025-03-25 RX ADMIN — OXYCODONE HYDROCHLORIDE AND ACETAMINOPHEN 1 TABLET: 5; 325 TABLET ORAL at 01:03

## 2025-03-25 RX ADMIN — MIDAZOLAM HYDROCHLORIDE 4 MG: 1 INJECTION, SOLUTION INTRAMUSCULAR; INTRAVENOUS at 07:03

## 2025-03-25 RX ADMIN — HYDROMORPHONE HYDROCHLORIDE 0.5 MG: 1 INJECTION, SOLUTION INTRAMUSCULAR; INTRAVENOUS; SUBCUTANEOUS at 08:03

## 2025-03-25 RX ADMIN — PROPOFOL 200 MG: 10 INJECTION, EMULSION INTRAVENOUS at 08:03

## 2025-03-25 RX ADMIN — FENTANYL CITRATE 50 MCG: 50 INJECTION INTRAMUSCULAR; INTRAVENOUS at 08:03

## 2025-03-25 RX ADMIN — MORPHINE SULFATE 2 MG: 2 INJECTION, SOLUTION INTRAMUSCULAR; INTRAVENOUS at 11:03

## 2025-03-25 RX ADMIN — LIDOCAINE HYDROCHLORIDE 100 MG: 20 INJECTION, SOLUTION EPIDURAL; INFILTRATION; INTRACAUDAL; PERINEURAL at 08:03

## 2025-03-25 RX ADMIN — Medication 30 MG: at 08:03

## 2025-03-25 RX ADMIN — EPHEDRINE SULFATE 25 MG: 50 INJECTION INTRAVENOUS at 08:03

## 2025-03-25 RX ADMIN — ROPIVACAINE HYDROCHLORIDE 30 ML: 5 INJECTION, SOLUTION EPIDURAL; INFILTRATION; PERINEURAL at 07:03

## 2025-03-25 RX ADMIN — CEFAZOLIN 2 G: 330 INJECTION, POWDER, FOR SOLUTION INTRAMUSCULAR; INTRAVENOUS at 08:03

## 2025-03-25 RX ADMIN — ONDANSETRON 4 MG: 2 INJECTION INTRAMUSCULAR; INTRAVENOUS at 10:03

## 2025-03-25 RX ADMIN — OXYCODONE HYDROCHLORIDE AND ACETAMINOPHEN 1 TABLET: 5; 325 TABLET ORAL at 12:03

## 2025-03-25 RX ADMIN — DEXAMETHASONE SODIUM PHOSPHATE 8 MG: 4 INJECTION, SOLUTION INTRA-ARTICULAR; INTRALESIONAL; INTRAMUSCULAR; INTRAVENOUS; SOFT TISSUE at 08:03

## 2025-03-25 RX ADMIN — DIPHENHYDRAMINE HYDROCHLORIDE 12.5 MG: 50 INJECTION INTRAMUSCULAR; INTRAVENOUS at 11:03

## 2025-03-25 RX ADMIN — Medication 20 MG: at 09:03

## 2025-03-25 NOTE — H&P
Interval H&P    Patient seen and examined in preop holding area. No changes to history or exam other than noted below.    To OR today for left knee ACL reconstruction with patella tendon autograft, LCL reconstruction with allograft (williamson); lateral meniscus repair versus debridement; possible MCL reconstruction with Dr. Hurtado.   ------------------------------------------    [facility]  Established Patient Office Visit  01/29/2025         Patient ID: Bob Hook  YOB: 1990  MRN: [id number]     Chief Complaint: Injury of the Left Knee (Patient is here today for  Left Knee .  Was this an injury  yes November 27, 2024 .How did it happen fell playing football in dorm. Taking mobic 7 1/2 in the morning and 7 1/2 in the afternoon not very effective.  Patient has also been doing elevation . Patient is using braces/)        Past Orthopaedic Surgeries: NA     HPI:  Bob Hook is a 34 y.o. male here for evaluation of his left knee.  Patient states that on November 27, 2024 he was playing football and sustained an injury to his left knee.  Patient states that his knee went 1 way and the rest of his leg with the other way.  Patient was seen in the ED and a CT scan and MRI were ordered which demonstrated a multi ligamentous knee injury.  He has been using a knee immobilizer since the injury.  He states that he has significant pain and instability of his left knee.  He states that he tries to keep as much weight off of the knee as possible because it feels grossly unstable.  He denies any significant medical history.  Denies previous injury to the knee     12 point ROS performed and negative except as above.      Past Medical History:          Past Medical History:   Diagnosis Date    ADD (attention deficit disorder) 10/31/2012    Anxiety      Behavioral problem      Depression      History of psychiatric care      Hypertension      Psychiatric exam      Psychiatric problem       Therapy              Past Surgical History:   Procedure Laterality Date    ADENOIDECTOMY        HERNIA REPAIR        TONSILLECTOMY        UVULECTOMY                 Family History   Problem Relation Name Age of Onset    Depression Sister        Anxiety disorder Sister        Thyroid disease Mother        Hypertension Father        Kidney disease Father        Diabetes Father        Heart disease Father        No Known Problems Sister          Social History            Socioeconomic History    Marital status: Single    Number of children: 0   Occupational History    Occupation: [employer]        Employer: [employer]   Tobacco Use    Smoking status: Former       Current packs/day: 0.25       Average packs/day: 0.3 packs/day for 3.0 years (0.8 ttl pk-yrs)       Types: Cigarettes, Vaping with nicotine       Passive exposure: Past    Smokeless tobacco: Former   Substance and Sexual Activity    Alcohol use: Not Currently       Alcohol/week: 5.0 standard drinks of alcohol       Types: 5 Cans of beer per week    Drug use: No       Types: Marijuana       Comment: opiates    Sexual activity: Yes       Partners: Female       Birth control/protection: OCP   Other Topics Concern    Caffeine Use: Excessive Yes    Legal: Other No    Leisure: Exercise Yes    Leisure: Fishing Yes    Childhood History: Raised by parents Yes    Leisure: Hunting Yes    Leisure: Movie Watching Yes    Leisure: Sports Yes    Education: High School Graduate Yes    Education: Unfinished college Yes    Home situation: lives with family Yes     Service Yes    Spirituality: Active Participation No    Spirituality: Organized Sikh No    Spirituality: Private Participation No    Patient feels they ought to cut down on drinking/drug use No    Legal: Arrest history No    Patient annoyed by others criticizing their drinking/drug use No    Legal: Involved in civil litigation No    Patient has felt bad or guilty about drinking/drug use No    Financial Status:  Employed Yes    Legal: Involved in criminal litigation No    Patient has had a drink/used drugs as an eye opener in the AM No   Social History Narrative     ** Merged History Encounter **           - [employer] since [employer], 2011.  Sikh.  No arrests or DUI.  No court dates pending.  In a 2 year heterosexual relationship,  No children, Born and raised in [place], [place],  Enjoys hunting and fishing and outdoors.  Strength- family supportive and loving.             Medication List with Changes/Refills   Current Medications     BUPROPION (WELLBUTRIN XL) 150 MG TB24 TABLET    Take 150 mg by mouth 3 (three) times daily.     BUPROPION (WELLBUTRIN XL) 150 MG TB24 TABLET    Take 150 mg by mouth once daily.     BUSPIRONE (BUSPAR) 15 MG TABLET    Take 15 mg by mouth 3 (three) times daily.     DEXTROAMPHETAMINE-AMPHETAMINE (AMPHETAMINE SALT COMBO) 20 MG TAB    Take 20 mg by mouth 2 (two) times daily.     DICLOFENAC (VOLTAREN) 75 MG EC TABLET    Take 1 tablet (75 mg total) by mouth 2 (two) times daily as needed (Pain).     SERTRALINE (ZOLOFT) 100 MG TABLET    Take 150 mg by mouth once daily.     SOFOSBUVIR-VELPATASVIR 400-100 MG TAB    Take 1 tablet by mouth once daily.      Review of patient's allergies indicates:  No Known Allergies     Physical Exam:     Left knee  Significant guarding throughout the exam which limits the findings  Gross tenderness to palpation throughout the knee  Able to do a straight leg raise  Knee range of motion from 0-40 degrees active  Laxity and pain felt on varus stress of the knee at both 0 and 30°  No significant laxity on valgus stress but painful  2B Lachman's        Imaging independently interpreted:  MRI of the left knee obtained previously on 12/09/2024 demonstrates a multi ligamentous knee injury with full-thickness tear of the ACL; questionable tear of PCL; fibular collateral ligament tear; MCL tear; posterior joint capsule tear; complex tear of lateral meniscus body and  posterior horn ; Small tear of medial meniscus posterior horn      Assessment and Plan:     Bob Hook is a 34 y.o. male who sustained a multi ligamentous knee injury on 11/27/2024     Case discussed with Dr. Hurtado  We will plan for ACL reconstruction with patella tendon autograft, LCL reconstruction with allograft (williamson); lateral meniscus repair versus debridement; possible MCL reconstruction  We will use fluoroscopy during surgery to perform stress x-rays  We will give patient a hinged knee brace today to get him started on range of motion  Okay to lock brace in extension when ambulating  Return to clinic after surgery  Consent day of surgery  Tentatively plan for surgery on 03/25/2025     Noel Benavidez, PGY-3  [employer] [facility]opa[facility]

## 2025-03-25 NOTE — OP NOTE
[facility]  Surgery Department  Operative Note    SUMMARY     Date of Procedure: 3/25/2025     Procedure:  Left ACL reconstruction with BTB autograft, LCL reconstruction with allograft    Surgeons and Role:     * Timothy Hurtado Jr., MD - Primary     * Dimitri Zuleta MD - Resident - Assisting     * Noel Benavidez MD - Resident - Assisting        Pre-Operative Diagnosis:  Left knee ACL tear, LCL tear, lateral meniscus tear, PCL injury    Post-Operative Diagnosis:  Same    Anesthesia: General    Operative Findings (including complications, if any): ACL tear, partial PCL tear, LCL tear    Description of Technical Procedures:  Patient was taken to the OR placed supine on the table.  Preoperative stress imaging confirmed widening of the lateral compartment with varus stress.  The medial compartment did not open significantly with valgus stress.  The left lower extremity was prepped and draped in normal sterile fashion.  Time-out was held and the correct patient, extremity, and procedure were confirmed.  Preoperative antibiotics were given.  The tourniquet was inflated 250 mmHg     We made an vertical incision along the course of the patella tendon. We sharply dissected the skin and subcutaneous tissue.  I lifted up the peritenon to expose the patella tendon.  I used a double bladed knife in order to harvest the middle third of the tendon.  I took a bony block off of the tibia as well as the patella.  This was brought to the back table prepped and sized.  The semi tendinosis allograft was also prepared for the LCL reconstruction.    The tendon was measured at size 10.  We then retensioned tendons on the back table, covered them with vancomycin soak gauze.     We made a lateral incision extending between the fibular head and Gerdy's tubercle proximally over the IT band.  Dissection was performed night T band was identified.  The biceps femoris tendon was palpated and the peroneal nerve was dissected  and freed to the level of the humeral neck.  We then protected the nerve and placed a wire through the head of the fibula about 105 cm distal to the tip.  Fluoroscopy confirmed appropriate placement of the wire.  We then over-reamed this with a 5.5 mm Reamer.    We then began the diagnostic arthroscopy. The patellofemoral joint was okay. There was no loose bodies or plicas. The medial and lateral gutters were clean. We then established an anterior medial portal using a spinal needle. The medial compartment had a no cartilage damage. The medial meniscus was intact without tears.  We then turned our attention to the lateral compartment. The lateral femoral condyle and plateau were without cartilage damage. The lateral meniscus was also intact with mild fraying and appeared to have a healed tear. We then turned out attention to the notch. The ACL was torn. The PCL was partially torn but had intact fibers. We debrided the scarred ACL remnant with a biter and shaver. We then began our tunnel preparation.     We used the accessory medial portal to access the lateral femoral condyle. We used a aiming guide to place our femoral pin. We drove this pin partially out of the lateral aspect of the knee. We then over reamed with an 10mm partially threaded reamer to a size just greater than the femoral plug. We then pulled the Beath pin out of the lateral aspect of the knee while shuttling a #5 permanent suture. We then turned our attention to the tibial tunnel. We used a elbow ACL guide placed at N + 10 of the tendon length in degrees into the ACL tibial footprint. We placed our 3/32 pin and then reamed with a fully threaded 10mm reamer. We cleaned the knee of all debris with a shaver. We then shuttled the shuttling suture from the femur down to the tibial tunnel. We then passed our graft up into the femur. We used a cortical button to secure the graft on the femoral side. We used a 9x25 mm metal screw to secure the tibia.       We then passed the semi tendinosis allograft through the previously reamed hole in the fibular head.  We tunneled this underneath the biceps and IT band to the lateral epicondyle which was cleaned off and the remnant of the LCL was identified.  We then passed a wire across the femur and drilled a 6.5 mm femoral tunnel.  We then passed a shuttling suture and shuttled the semi tendinosis allograft into the reamed hole and secured it with a 6 x 20 mm [employer] screw.    The wounds were copiously irrigated.  Patella and tibia harvest sites were grafted. We closed the patella tendon and peritenon layer starting with a #1 interrupted Vicryl. The subcutaneous tissue was closed with 2-0 Monocryl and the skin and portals with staples.  A sterile dressing was applied.  A hinged knee brace was applied.  The patient was awoken unremarkably from anesthesia and transferred back to the postoperative unit.      Estimated Blood Loss (EBL): 50 mL           Implants:   Implant Name Type Inv. Item Serial No.  Lot No. LRB No. Used Action   KIT FIXATION BTB TIGHTROPE - [id number]  KIT FIXATION BTB TIGHTROPE  [employer] [id number] Left 1 Implanted   SEMITENDINOSUS TENDON < 26CM LENGTH   [id number]  Left 1 Implanted   POSTERIOR TIBIALIS TENDON: SMALL DIAMETER   [id number]  Left 1 Wasted       Specimens:   Specimen (24h ago, onward)      None           * No specimens in log *           Condition: Good    Disposition: PACU - hemodynamically stable.    Plan   Hinged knee brace open to 90° for 1st 2 weeks   Weightbearing in full extension only 4 weeks  We will advance range of motion as appropriate 30° every few weeks

## 2025-03-25 NOTE — DISCHARGE INSTRUCTIONS
Ortho    · Keep follow up appointment  at [facility].    `  Resume home medications unless otherwise instructed by your doctor.     · Take pain medications as prescribed.  You may takeTylenol every 6 hours as needed for discomfort.   Take meloxicam daily- is is an anti inflammatory If you are hurting more-you may  add the oxycodone (narcotic) to these medications.  You have also been prescribed aspirin to help prevent blood clots, gabapentin for nerve pain and methocarbamol for muscle relaxer.    ` See included nerve block handout   · Keep knee elevated on pillow, higher than the level of your heart,  to decrease pain and swelling.    · No driving or consuming alcohol for the next 24 hours or while taking narcotic pain medicine.    · May apply ice pack to surgical area for 20 minutes at a time 6-8 times per day.    · Dressing: Leave clean and dry until follow up appointment.    · Wiggle toes and move ankle regularly to help prevent blood clots and to keep range of motion.    · Notify MD of any moderate to severe pain unrelieved by pain medicine or for any signs of infection including fever above 100.4, excessive redness or swelling, yellow/green foul- smelling drainage, nausea or vomiting. Call clinic at: [phone]. After business hours, if your concern is an emergency, you may need to be evaluated @ and urgent care or emergency department  · Thanks for choosing [facility]! Have a smooth recovery!

## 2025-03-25 NOTE — ANESTHESIA PROCEDURE NOTES
Intubation    Date/Time: 3/25/2025 8:14 AM    Performed by: Ovi Michaels CRNA  Authorized by: Laurie Contreras MD    Intubation:     Induction:  Intravenous    Intubated:  Postinduction    Mask Ventilation:  Not attempted    Attempts:  1    Attempted By:  CRNA    Difficult Airway Encountered?: No      Complications:  None    Airway Device:  Supraglottic airway/LMA    Airway Device Size:  4.0    Style/Cuff Inflation:  Other (see comments) (IGEL)    Secured at:  The lips    Placement Verified By:  Capnometry    Complicating Factors:  None    Findings Post-Intubation:  BS equal bilateral and atraumatic/condition of teeth unchanged

## 2025-03-25 NOTE — DISCHARGE SUMMARY
[facility]  Brief Operative Note    Surgery Date:  3/25/2025    Surgeon(s) and Role:     Timothy Hurtado - Primary    Assisting Surgeon: Noel Benavidez MD; Dimitri Zuleta MD    Pre-op Diagnosis:  Rupture of ACL of left knee; LCL rupture of left knee    Post-op Diagnosis:    Post-Op Diagnosis Codes:     * Rupture of anterior cruciate ligament of left knee, initial encounter [S83.512A]    Procedure(s) (LRB):  RECONSTRUCTION, KNEE, ACL, ARTHROSCOPIC (Left)  Reconstruction, LIGAMENT, KNEE, COLLATERAL (Left)  ARTHROSCOPY,KNEE,WITH MENISCUS DEBRIDEMENT (Left)    Anesthesia: Choice    Operative Findings:  Rupture of ACL; partial tearing of PCL; LCL rupture  Estimated Blood Loss: see full op note          Specimens:   Specimen (24h ago, onward)      None              Discharge Note    OUTCOME: Patient tolerated treatment/procedure well without complication and is now ready for discharge.    DISPOSITION:  Custody    FINAL DIAGNOSIS:  Internal derangement left knee with ACL rupture, partial tearing of PCL; LCL tear    FOLLOWUP: In clinic    DISCHARGE INSTRUCTIONS:  Keep dressing clean, dry, intact. Take prescribed medications as needed for pain.  Okay to range the knee from 0-90 degrees in a hinged brace.  Weightbearing as tolerated with the brace on locked in extension.    Take prescribed pain medications (Oxycodone, tylenol, ibuprofen as needed for pain)     Take robaxin for muscle spasms.     Take gabapentin for nerve pain.     Take aspirin to prevent a DVT.

## 2025-03-25 NOTE — TRANSFER OF CARE
Anesthesia Transfer of Care Note    Patient: Bob Hook    Procedure(s) Performed: Procedure(s) (LRB):  RECONSTRUCTION, KNEE, ACL, ARTHROSCOPIC (Left)  Reconstruction, LIGAMENT, KNEE, COLLATERAL (Left)  ARTHROSCOPY,KNEE,WITH MENISCUS REPAIR (Left)  RECONSTRUCTION, KNEE, ACL, USING GRAFT (Left)    Patient location: PACU    Anesthesia Type: general    Transport from OR: Transported from OR on room air with adequate spontaneous ventilation    Post pain: adequate analgesia    Post assessment: no apparent anesthetic complications    Post vital signs: stable    Level of consciousness: awake    Nausea/Vomiting: no nausea/vomiting    Complications: none    Transfer of care protocol was followed      Last vitals: Visit Vitals  BP (!) 138/107   Pulse 73   Temp 36.4 °C (97.5 °F) (Oral)   Wt 102.4 kg (225 lb 12.8 oz)   SpO2 98%   BMI 30.62 kg/m²

## 2025-03-25 NOTE — ANESTHESIA PROCEDURE NOTES
Peripheral Block    Patient location during procedure: pre-op   Block not for primary anesthetic.  Reason for block: at surgeon's request and post-op pain management   Post-op Pain Location: Lt knee pain   Start time: 3/25/2025 7:15 AM  Timeout: 3/25/2025 7:15 AM   End time: 3/25/2025 7:20 AM    Staffing  Authorizing Provider: Laurie Contreras MD  Performing Provider: Laurie Contreras MD    Staffing  Performed by: Laurie Contreras MD  Authorized by: Laurie Contreras MD    Preanesthetic Checklist  Completed: patient identified, IV checked, site marked, risks and benefits discussed, surgical consent, monitors and equipment checked, pre-op evaluation and timeout performed  Peripheral Block  Patient position: supine  Prep: ChloraPrep  Patient monitoring: heart rate, cardiac monitor, continuous pulse ox, continuous capnometry and frequent blood pressure checks  Block type: adductor canal  Laterality: left  Injection technique: single shot  Needle  Needle type: Stimuplex   Needle gauge: 21 G  Needle length: 4 in  Needle localization: anatomical landmarks and ultrasound guidance   -ultrasound image captured on disc.  Assessment  Injection assessment: negative aspiration, negative parasthesia and local visualized surrounding nerve  Paresthesia pain: none  Heart rate change: no  Slow fractionated injection: yes  Pain Tolerance: comfortable throughout block and no complaints  Medications:    Medications: ropivacaine (NAROPIN) injection 0.5% - Perineural   30 mL - 3/25/2025 7:15:00 AM    Additional Notes  VSS.  [facility] RN monitoring vitals throughout procedure.  Patient tolerated procedure well.

## 2025-03-27 RX ORDER — HYDROMORPHONE HYDROCHLORIDE 1 MG/ML
INJECTION, SOLUTION INTRAMUSCULAR; INTRAVENOUS; SUBCUTANEOUS
Status: DISCONTINUED | OUTPATIENT
Start: 2025-03-27 | End: 2025-03-27

## 2025-03-28 NOTE — ANESTHESIA POSTPROCEDURE EVALUATION
Anesthesia Post Evaluation    Patient: Bob Hook    Procedure(s) Performed: Procedure(s) (LRB):  RECONSTRUCTION, KNEE, ACL, ARTHROSCOPIC (Left)  Reconstruction, LIGAMENT, KNEE, COLLATERAL (Left)  ARTHROSCOPY,KNEE,WITH MENISCUS REPAIR (Left)  RECONSTRUCTION, KNEE, ACL, USING GRAFT (Left)    Final Anesthesia Type: general      Patient location during evaluation: [facility]  Post-procedure vital signs: reviewed and stable  Airway patency: patent      Anesthetic complications: no      Cardiovascular status: hemodynamically stable  Respiratory status: spontaneous ventilation  Follow-up not needed.              Vitals Value Taken Time   /73 03/25/25 13:46   Temp 36.5 °C (97.7 °F) 03/25/25 13:45   Pulse 106 03/25/25 13:56   Resp 18 03/25/25 13:55   SpO2 95 % 03/25/25 13:56   Vitals shown include unfiled device data.      Event Time   Out of Recovery 12:03:00         Pain/Kylie Score: No data recorded

## 2025-04-07 ENCOUNTER — OFFICE VISIT (OUTPATIENT)
Dept: ORTHOPEDICS | Facility: CLINIC | Age: 35
End: 2025-04-07
Payer: COMMERCIAL

## 2025-04-07 VITALS
TEMPERATURE: 98 F | BODY MASS INDEX: 32.51 KG/M2 | HEIGHT: 72 IN | HEART RATE: 63 BPM | WEIGHT: 240 LBS | SYSTOLIC BLOOD PRESSURE: 133 MMHG | DIASTOLIC BLOOD PRESSURE: 90 MMHG

## 2025-04-07 DIAGNOSIS — S83.512A RUPTURE OF ANTERIOR CRUCIATE LIGAMENT OF LEFT KNEE, INITIAL ENCOUNTER: Primary | ICD-10-CM

## 2025-04-07 PROCEDURE — 99214 OFFICE O/P EST MOD 30 MIN: CPT | Mod: PBBFAC

## 2025-04-07 PROCEDURE — 99024 POSTOP FOLLOW-UP VISIT: CPT | Mod: S$PBB,,, | Performed by: SPECIALIST

## 2025-04-07 NOTE — PROGRESS NOTES
[facility]  Established Patient Office Visit  04/07/2025       Patient ID: Bob Hook  YOB: 1990  MRN: [id number]    Chief Complaint:   Initial postop evaluation status post left ACL with BTB autograft, LCL with allograft 03/25/2025      Subjective:  Patient returns today for initial postoperative evaluation, he is doing well, has been compliant with weight-bearing restrictions.  He states he will soon change facilities and should be able to undergo guided physical therapy.  He denies interval injuries, wound complications, fevers or chills.        Physical Exam:  Vitals:    04/07/25 1112   BP: (!) 133/90   Pulse: 63   Temp: 98.2 °F (36.8 °C)       Left knee  Moderate effusion, no increased warmth  Surgical incisions over the anterior, as well as portals are healing appropriately with no evidence of infection  Knee range of motion from 0-90° active  Lachman's deferred, knee stable to varus and valgus  Neurovascularly intact distally    Imaging independently interpreted:  No new imaging    Assessment and Plan:    Bob Hook is a 34 y.o. male who sustained a multi ligamentous knee injury on 11/27/2024    -PT instructions placed today  -instructed the patient to remain locked in full extension when ambulatory for 1st 4 weeks, then may gradually increase flexion, okay to increase flexion when nonweightbearing 30° every 2-3 weeks  -return to clinic for re-evaluation 6 weeks postop            Larry Elkins, PGY 5

## 2025-04-08 NOTE — PROGRESS NOTES
Faculty Attestation: Bob Hook  was seen at [facility] in the Orthopaedic Clinic. Discussed with the resident at the time of the visit. History of Present Illness, Physical Exam, and Assessment and Plan reviewed. Treatment plan is reasonable and appropriate. Compliance with treatment recommendations is important. No procedure was performed.     Sal Crook MD  Orthopaedic Surgery

## (undated) DEVICE — BNDG COFLEX FOAM LF2 ST 6X5YD

## (undated) DEVICE — COVER TABLE HVY DTY 60X90IN

## (undated) DEVICE — MAT SUCTION PUDDLEVAC ORANGE

## (undated) DEVICE — IMPLANTABLE DEVICE: Type: IMPLANTABLE DEVICE | Site: KNEE | Status: NON-FUNCTIONAL

## (undated) DEVICE — CONTAINER SPECIMEN OR STER 4OZ

## (undated) DEVICE — BRACE KNEE POST OP HNG PD 17IN

## (undated) DEVICE — BLADE SAW NRRW MED 18.5X7

## (undated) DEVICE — SPONGE LAP 18X18 PREWASHED

## (undated) DEVICE — NDL FLTR 5MCRN BLNT TIP 18GX1

## (undated) DEVICE — REAMER LOW PROFILE 10 MM

## (undated) DEVICE — PADDING CAST SPECIALIST 6X4YD

## (undated) DEVICE — SUT MONO PLUS 2-0 CP-1 27IN

## (undated) DEVICE — YANKAUER FLEX HAND FINE

## (undated) DEVICE — TOWEL OR DISP STRL BLUE 4/PK

## (undated) DEVICE — HYDROGEN PEROXIDE HDX10 3% 4OZ

## (undated) DEVICE — GLOVE PROTEXIS BLUE LATEX 6.5

## (undated) DEVICE — PENCIL ROCKER SWITCH 10FT CORD

## (undated) DEVICE — NDL MAGELLAN SAFETY 18G 1.5IN

## (undated) DEVICE — BANDAGE ESMARK 6X12

## (undated) DEVICE — SUT TAPE TIGERLOOP 1.3MM

## (undated) DEVICE — TUBE SET INFLOW/OUTFLOW

## (undated) DEVICE — SUT TAPE FIBERLOOP 1.3MM

## (undated) DEVICE — CLOSURE SKIN STERI STRIP 1/2X4

## (undated) DEVICE — TOURNIQUET SB QC DP 24X4IN

## (undated) DEVICE — SUT BLU BR 2 TAPERD NDL 1/2

## (undated) DEVICE — GLOVE PROTEXIS HYDROGEL SZ6

## (undated) DEVICE — SUT CTD VICRYL 2.0

## (undated) DEVICE — STAPLER SKIN COUNT 35 W STPL

## (undated) DEVICE — MARKER WRITESITE SKIN CHLRAPRP

## (undated) DEVICE — PROBE ARTHO ENERGY 90 DEG

## (undated) DEVICE — SUT FIBERLINK TAPE BLU WHT 1.3

## (undated) DEVICE — DRAPE STERI U-SHAPED 47X51IN

## (undated) DEVICE — CAUTERY TIP POLISHER

## (undated) DEVICE — PIN DRILL ACL T ROPE 4MM

## (undated) DEVICE — ELECTRODE PATIENT RETURN DISP

## (undated) DEVICE — SYR 50CC LL

## (undated) DEVICE — BLADE SHAVER LANZA 4.2X13CM

## (undated) DEVICE — APPLICATOR CHLORAPREP ORN 26ML

## (undated) DEVICE — SYR DISP LL 5CC

## (undated) DEVICE — TUBING SUCTION STRAIGHT .25X20

## (undated) DEVICE — SUT 3-0 MONOCRYL PLUS PS-2

## (undated) DEVICE — GLOVE PROTEXIS HYDROGEL SZ8

## (undated) DEVICE — DRAPE FULL SHEET 70X100IN

## (undated) DEVICE — KIT SURGICAL TURNOVER

## (undated) DEVICE — SYR 30CC LUER LOCK

## (undated) DEVICE — GOWN POLY REINF BRTH SLV XL

## (undated) DEVICE — STAPLER SKIN ROTATING HEAD

## (undated) DEVICE — SPONGE COTTON TRAY 4X4IN

## (undated) DEVICE — NDL SAFETY 21G X 1 1/2 ECLPSE

## (undated) DEVICE — GOWN POLY REINF X-LONG 2XL

## (undated) DEVICE — PEROXIDE HYDROGEN 3% 16OZ

## (undated) DEVICE — BLADE SURG STAINLESS STEEL #10

## (undated) DEVICE — PACK SURGICAL KNEE SCOPE

## (undated) DEVICE — PAD ABDOMINAL STERILE 8X10IN

## (undated) DEVICE — SYR 10CC LUER LOCK

## (undated) DEVICE — COVER MAYO STND XL 30X57IN

## (undated) DEVICE — SUT 1 36IN COATED VICRYL UN

## (undated) DEVICE — DRAPE C-ARM COVER EZ 36X28IN

## (undated) DEVICE — SAWBLADE TRANS TIB ACL

## (undated) DEVICE — GLOVE PROTEXIS LTX MICRO 8

## (undated) DEVICE — SUT 38 FIBERWIRE #2

## (undated) DEVICE — SOL NACL IRR 3000ML

## (undated) DEVICE — Device

## (undated) DEVICE — GAUZE XEROFORM NONADH 5X9IN

## (undated) DEVICE — KNIFE ACL GRAFT 10MM

## (undated) DEVICE — PAD PREP CUFFED NS 24X48IN

## (undated) DEVICE — CUFF TOURNIQUET STER DIS 34

## (undated) DEVICE — BLADE SURG STAINLESS STEEL #15